# Patient Record
Sex: MALE | Race: WHITE | Employment: OTHER | URBAN - METROPOLITAN AREA
[De-identification: names, ages, dates, MRNs, and addresses within clinical notes are randomized per-mention and may not be internally consistent; named-entity substitution may affect disease eponyms.]

---

## 2021-04-24 ENCOUNTER — HOSPITAL ENCOUNTER (OUTPATIENT)
Age: 86
Setting detail: OBSERVATION
Discharge: HOME | End: 2021-04-26
Attending: STUDENT IN AN ORGANIZED HEALTH CARE EDUCATION/TRAINING PROGRAM | Admitting: HOSPITALIST
Payer: MEDICARE

## 2021-04-24 DIAGNOSIS — D64.9 ANEMIA, UNSPECIFIED TYPE: ICD-10-CM

## 2021-04-24 DIAGNOSIS — R04.0 EPISTAXIS: Primary | ICD-10-CM

## 2021-04-24 LAB
APTT PPP: 23.8 SEC (ref 22.1–31)
BASOPHILS # BLD: 0.1 K/UL (ref 0–0.1)
BASOPHILS NFR BLD: 1 % (ref 0–1)
COMMENT, HOLDF: NORMAL
DIFFERENTIAL METHOD BLD: ABNORMAL
EOSINOPHIL # BLD: 0.6 K/UL (ref 0–0.4)
EOSINOPHIL NFR BLD: 7 % (ref 0–7)
ERYTHROCYTE [DISTWIDTH] IN BLOOD BY AUTOMATED COUNT: 13.2 % (ref 11.5–14.5)
HCT VFR BLD AUTO: 32.5 % (ref 36.6–50.3)
HGB BLD-MCNC: 10.2 G/DL (ref 12.1–17)
IMM GRANULOCYTES # BLD AUTO: 0 K/UL (ref 0–0.04)
IMM GRANULOCYTES NFR BLD AUTO: 0 % (ref 0–0.5)
INR PPP: 1.1 (ref 0.9–1.1)
LYMPHOCYTES # BLD: 1.2 K/UL (ref 0.8–3.5)
LYMPHOCYTES NFR BLD: 12 % (ref 12–49)
MCH RBC QN AUTO: 31.3 PG (ref 26–34)
MCHC RBC AUTO-ENTMCNC: 31.4 G/DL (ref 30–36.5)
MCV RBC AUTO: 99.7 FL (ref 80–99)
MONOCYTES # BLD: 0.9 K/UL (ref 0–1)
MONOCYTES NFR BLD: 9 % (ref 5–13)
NEUTS SEG # BLD: 6.9 K/UL (ref 1.8–8)
NEUTS SEG NFR BLD: 71 % (ref 32–75)
NRBC # BLD: 0 K/UL (ref 0–0.01)
NRBC BLD-RTO: 0 PER 100 WBC
PLATELET # BLD AUTO: 178 K/UL (ref 150–400)
PMV BLD AUTO: 11.2 FL (ref 8.9–12.9)
PROTHROMBIN TIME: 11.3 SEC (ref 9–11.1)
RBC # BLD AUTO: 3.26 M/UL (ref 4.1–5.7)
SAMPLES BEING HELD,HOLD: NORMAL
THERAPEUTIC RANGE,PTTT: NORMAL SECS (ref 58–77)
WBC # BLD AUTO: 9.7 K/UL (ref 4.1–11.1)

## 2021-04-24 PROCEDURE — 85025 COMPLETE CBC W/AUTO DIFF WBC: CPT

## 2021-04-24 PROCEDURE — 85610 PROTHROMBIN TIME: CPT

## 2021-04-24 PROCEDURE — 75810000284 HC CNTRL NASAL HEMORHRAGE SIMPLE

## 2021-04-24 PROCEDURE — 74011000250 HC RX REV CODE- 250: Performed by: STUDENT IN AN ORGANIZED HEALTH CARE EDUCATION/TRAINING PROGRAM

## 2021-04-24 PROCEDURE — 99285 EMERGENCY DEPT VISIT HI MDM: CPT

## 2021-04-24 PROCEDURE — 36415 COLL VENOUS BLD VENIPUNCTURE: CPT

## 2021-04-24 PROCEDURE — 86901 BLOOD TYPING SEROLOGIC RH(D): CPT

## 2021-04-24 PROCEDURE — 85730 THROMBOPLASTIN TIME PARTIAL: CPT

## 2021-04-24 RX ORDER — OXYMETAZOLINE HCL 0.05 %
2 SPRAY, NON-AEROSOL (ML) NASAL
Status: DISCONTINUED | OUTPATIENT
Start: 2021-04-24 | End: 2021-04-24

## 2021-04-24 RX ORDER — TRANEXAMIC ACID 100 MG/ML
1 INJECTION, SOLUTION INTRAVENOUS ONCE
Status: COMPLETED | OUTPATIENT
Start: 2021-04-24 | End: 2021-04-24

## 2021-04-24 RX ADMIN — TRANEXAMIC ACID 1000 MG: 1 INJECTION, SOLUTION INTRAVENOUS at 21:37

## 2021-04-25 LAB
ABO + RH BLD: NORMAL
ALBUMIN SERPL-MCNC: 3.2 G/DL (ref 3.5–5)
ALBUMIN/GLOB SERPL: 1 {RATIO} (ref 1.1–2.2)
ALP SERPL-CCNC: 99 U/L (ref 45–117)
ALT SERPL-CCNC: 28 U/L (ref 12–78)
ANION GAP SERPL CALC-SCNC: 4 MMOL/L (ref 5–15)
AST SERPL-CCNC: 29 U/L (ref 15–37)
BASOPHILS # BLD: 0.1 K/UL (ref 0–0.1)
BASOPHILS NFR BLD: 0 % (ref 0–1)
BILIRUB SERPL-MCNC: 0.3 MG/DL (ref 0.2–1)
BLOOD GROUP ANTIBODIES SERPL: NORMAL
BUN SERPL-MCNC: 44 MG/DL (ref 6–20)
BUN/CREAT SERPL: 33 (ref 12–20)
CALCIUM SERPL-MCNC: 8.4 MG/DL (ref 8.5–10.1)
CHLORIDE SERPL-SCNC: 111 MMOL/L (ref 97–108)
CO2 SERPL-SCNC: 27 MMOL/L (ref 21–32)
CREAT SERPL-MCNC: 1.32 MG/DL (ref 0.7–1.3)
DIFFERENTIAL METHOD BLD: ABNORMAL
EOSINOPHIL # BLD: 0.1 K/UL (ref 0–0.4)
EOSINOPHIL NFR BLD: 1 % (ref 0–7)
ERYTHROCYTE [DISTWIDTH] IN BLOOD BY AUTOMATED COUNT: 13.2 % (ref 11.5–14.5)
GLOBULIN SER CALC-MCNC: 3.2 G/DL (ref 2–4)
GLUCOSE SERPL-MCNC: 108 MG/DL (ref 65–100)
HCT VFR BLD AUTO: 31.2 % (ref 36.6–50.3)
HGB BLD-MCNC: 10 G/DL (ref 12.1–17)
IMM GRANULOCYTES # BLD AUTO: 0.1 K/UL (ref 0–0.04)
IMM GRANULOCYTES NFR BLD AUTO: 0 % (ref 0–0.5)
LYMPHOCYTES # BLD: 1.2 K/UL (ref 0.8–3.5)
LYMPHOCYTES NFR BLD: 10 % (ref 12–49)
MCH RBC QN AUTO: 31.8 PG (ref 26–34)
MCHC RBC AUTO-ENTMCNC: 32.1 G/DL (ref 30–36.5)
MCV RBC AUTO: 99.4 FL (ref 80–99)
MONOCYTES # BLD: 0.7 K/UL (ref 0–1)
MONOCYTES NFR BLD: 6 % (ref 5–13)
NEUTS SEG # BLD: 9.9 K/UL (ref 1.8–8)
NEUTS SEG NFR BLD: 83 % (ref 32–75)
NRBC # BLD: 0 K/UL (ref 0–0.01)
NRBC BLD-RTO: 0 PER 100 WBC
PLATELET # BLD AUTO: 147 K/UL (ref 150–400)
PMV BLD AUTO: 11.4 FL (ref 8.9–12.9)
POTASSIUM SERPL-SCNC: 4.8 MMOL/L (ref 3.5–5.1)
PROT SERPL-MCNC: 6.4 G/DL (ref 6.4–8.2)
RBC # BLD AUTO: 3.14 M/UL (ref 4.1–5.7)
SODIUM SERPL-SCNC: 142 MMOL/L (ref 136–145)
SPECIMEN EXP DATE BLD: NORMAL
WBC # BLD AUTO: 12 K/UL (ref 4.1–11.1)

## 2021-04-25 PROCEDURE — 99218 HC RM OBSERVATION: CPT

## 2021-04-25 PROCEDURE — 80053 COMPREHEN METABOLIC PANEL: CPT

## 2021-04-25 PROCEDURE — 85025 COMPLETE CBC W/AUTO DIFF WBC: CPT

## 2021-04-25 PROCEDURE — 36415 COLL VENOUS BLD VENIPUNCTURE: CPT

## 2021-04-25 PROCEDURE — 74011250637 HC RX REV CODE- 250/637: Performed by: HOSPITALIST

## 2021-04-25 RX ORDER — ACETAMINOPHEN 650 MG/1
650 SUPPOSITORY RECTAL
Status: DISCONTINUED | OUTPATIENT
Start: 2021-04-25 | End: 2021-04-26 | Stop reason: HOSPADM

## 2021-04-25 RX ORDER — PROMETHAZINE HYDROCHLORIDE 25 MG/1
12.5 TABLET ORAL
Status: DISCONTINUED | OUTPATIENT
Start: 2021-04-25 | End: 2021-04-26 | Stop reason: HOSPADM

## 2021-04-25 RX ORDER — POLYETHYLENE GLYCOL 3350 17 G/17G
17 POWDER, FOR SOLUTION ORAL DAILY PRN
Status: DISCONTINUED | OUTPATIENT
Start: 2021-04-25 | End: 2021-04-26 | Stop reason: HOSPADM

## 2021-04-25 RX ORDER — AMOXICILLIN AND CLAVULANATE POTASSIUM 875; 125 MG/1; MG/1
1 TABLET, FILM COATED ORAL EVERY 12 HOURS
Status: DISCONTINUED | OUTPATIENT
Start: 2021-04-25 | End: 2021-04-25

## 2021-04-25 RX ORDER — BRINZOLAMIDE 10 MG/ML
1 SUSPENSION/ DROPS OPHTHALMIC 2 TIMES DAILY
Status: DISCONTINUED | OUTPATIENT
Start: 2021-04-25 | End: 2021-04-26 | Stop reason: HOSPADM

## 2021-04-25 RX ORDER — SODIUM CHLORIDE 0.9 % (FLUSH) 0.9 %
5-40 SYRINGE (ML) INJECTION AS NEEDED
Status: DISCONTINUED | OUTPATIENT
Start: 2021-04-25 | End: 2021-04-26 | Stop reason: HOSPADM

## 2021-04-25 RX ORDER — ACETAMINOPHEN 325 MG/1
650 TABLET ORAL
Status: DISCONTINUED | OUTPATIENT
Start: 2021-04-25 | End: 2021-04-26 | Stop reason: HOSPADM

## 2021-04-25 RX ORDER — AMOXICILLIN AND CLAVULANATE POTASSIUM 875; 125 MG/1; MG/1
1 TABLET, FILM COATED ORAL EVERY 12 HOURS
Status: DISCONTINUED | OUTPATIENT
Start: 2021-04-25 | End: 2021-04-26 | Stop reason: HOSPADM

## 2021-04-25 RX ORDER — TIMOLOL MALEATE 5 MG/ML
1 SOLUTION/ DROPS OPHTHALMIC EVERY MORNING
Status: DISCONTINUED | OUTPATIENT
Start: 2021-04-26 | End: 2021-04-26 | Stop reason: HOSPADM

## 2021-04-25 RX ORDER — SODIUM CHLORIDE 0.9 % (FLUSH) 0.9 %
5-40 SYRINGE (ML) INJECTION EVERY 8 HOURS
Status: DISCONTINUED | OUTPATIENT
Start: 2021-04-25 | End: 2021-04-26 | Stop reason: HOSPADM

## 2021-04-25 RX ORDER — ONDANSETRON 2 MG/ML
4 INJECTION INTRAMUSCULAR; INTRAVENOUS
Status: DISCONTINUED | OUTPATIENT
Start: 2021-04-25 | End: 2021-04-26 | Stop reason: HOSPADM

## 2021-04-25 RX ADMIN — Medication 10 ML: at 13:39

## 2021-04-25 RX ADMIN — AMOXICILLIN AND CLAVULANATE POTASSIUM 1 TABLET: 875; 125 TABLET, FILM COATED ORAL at 21:31

## 2021-04-25 RX ADMIN — AMOXICILLIN AND CLAVULANATE POTASSIUM 1 TABLET: 875; 125 TABLET, FILM COATED ORAL at 12:32

## 2021-04-25 RX ADMIN — BRINZOLAMIDE 1 DROP: 10 SUSPENSION/ DROPS OPHTHALMIC at 21:27

## 2021-04-25 NOTE — PROGRESS NOTES
Bedside and Verbal shift change report given to Jose Angel Zimmer RN (oncoming nurse) by Sasha Murcia RN (offgoing nurse). Report included the following information SBAR.

## 2021-04-25 NOTE — ED NOTES
Bedside shift change report given to Jennifer Glass (oncoming nurse) by Aren Bernard (offgoing nurse). Report included the following information SBAR, ED Summary and Recent Results.

## 2021-04-25 NOTE — ROUTINE PROCESS
TRANSFER - OUT REPORT: 
 
Verbal report given to Rodrigo Byrnes RN (name) on Erica Waldrop  being transferred to (unit) for routine progression of care Report consisted of patients Situation, Background, Assessment and  
Recommendations(SBAR). Information from the following report(s) SBAR, Kardex, ED Summary, Intake/Output, MAR and Recent Results was reviewed with the receiving nurse. Lines:  
Peripheral IV 04/24/21 Right Wrist (Active) Site Assessment Clean, dry, & intact 04/24/21 2349 Alcohol Cap Used No 04/24/21 2349 Opportunity for questions and clarification was provided. Patient transported with: 
 Registered Nurse

## 2021-04-25 NOTE — H&P
HISTORY AND PHYSICAL      PCP: None  History source: the patient, family      CC: nosebleed      HPI: 80 y.o man with CAD and carotid artery stenosis on aspirin and Plavix who pesents with epistaxis. He had an episode earlier in the week and went to [facility] EMERGENCY [facility] ER where he had afrin and temporary packing. He was discharged home from the ED. Earlier in the night he blew his nose and started bleeding profusely from both nares. He is now s/p left nare packing in the ED. He denies dyspnea or dizziness. PMH/PSH: per family  CAD  Carotid artery stenosis    Home meds:   ASA  Plavix    Allergies: Allergies   Allergen Reactions    Penicillins Unknown (comments)       FH:  History reviewed. No pertinent family history. SH:  Social History     Tobacco Use    Smoking status: Former Smoker    Smokeless tobacco: Never Used   Substance Use Topics    Alcohol use: Yes     Comment: Wine on occasion       ROS: A comprehensive review of systems was negative except for that written in the HPI. PHYSICAL EXAM:  Visit Vitals  BP (!) 115/97   Pulse 95   Temp 98.5 °F (36.9 °C)   Resp 18   SpO2 97%       Gen: NAD, non-toxic  HEENT: anicteric sclerae, normal conjunctiva, L nare packed.  Surrounding dried blood  Neck: supple, trachea midline, no adenopathy  Heart: RRR, no MRG, no JVD  Lungs: CTA b/l, non-labored respirations  Abd: soft, NT, ND, BS+  Extr: warm  Skin: dry, no rash  Neuro: CN II-XII grossly intact, normal speech, moves all extremities  Psych: normal mood, appropriate affect      Labs/Imaging:  Recent Results (from the past 24 hour(s))   CBC WITH AUTOMATED DIFF    Collection Time: 04/24/21  9:48 PM   Result Value Ref Range    WBC 9.7 4.1 - 11.1 K/uL    RBC 3.26 (L) 4.10 - 5.70 M/uL    HGB 10.2 (L) 12.1 - 17.0 g/dL    HCT 32.5 (L) 36.6 - 50.3 %    MCV 99.7 (H) 80.0 - 99.0 FL    MCH 31.3 26.0 - 34.0 PG    MCHC 31.4 30.0 - 36.5 g/dL    RDW 13.2 11.5 - 14.5 %    PLATELET 723 529 - 352 K/uL    MPV 11.2 8.9 - 12.9 FL NRBC 0.0 0  WBC    ABSOLUTE NRBC 0.00 0.00 - 0.01 K/uL    NEUTROPHILS 71 32 - 75 %    LYMPHOCYTES 12 12 - 49 %    MONOCYTES 9 5 - 13 %    EOSINOPHILS 7 0 - 7 %    BASOPHILS 1 0 - 1 %    IMMATURE GRANULOCYTES 0 0.0 - 0.5 %    ABS. NEUTROPHILS 6.9 1.8 - 8.0 K/UL    ABS. LYMPHOCYTES 1.2 0.8 - 3.5 K/UL    ABS. MONOCYTES 0.9 0.0 - 1.0 K/UL    ABS. EOSINOPHILS 0.6 (H) 0.0 - 0.4 K/UL    ABS. BASOPHILS 0.1 0.0 - 0.1 K/UL    ABS. IMM. GRANS. 0.0 0.00 - 0.04 K/UL    DF AUTOMATED     PTT    Collection Time: 04/24/21  9:48 PM   Result Value Ref Range    aPTT 23.8 22.1 - 31.0 sec    aPTT, therapeutic range     58.0 - 77.0 SECS   PROTHROMBIN TIME + INR    Collection Time: 04/24/21  9:48 PM   Result Value Ref Range    INR 1.1 0.9 - 1.1      Prothrombin time 11.3 (H) 9.0 - 11.1 sec   SAMPLES BEING HELD    Collection Time: 04/24/21  9:48 PM   Result Value Ref Range    SAMPLES BEING HELD 1PST 1RED     COMMENT        Add-on orders for these samples will be processed based on acceptable specimen integrity and analyte stability, which may vary by analyte. Recent Labs     04/24/21 2148   WBC 9.7   HGB 10.2*   HCT 32.5*        No results for input(s): NA, K, CL, CO2, BUN, CREA, GLU, CA, MG, PHOS, URICA in the last 72 hours. No results for input(s): ALT, AP, TBIL, TBILI, TP, ALB, GLOB, GGT, AML, LPSE in the last 72 hours. No lab exists for component: SGOT, GPT, AMYP, HLPSE    No results for input(s): CPK, CKNDX, TROIQ in the last 72 hours. No lab exists for component: CPKMB    Recent Labs     04/24/21 2148   INR 1.1   PTP 11.3*   APTT 23.8        No results for input(s): PH, PCO2, PO2 in the last 72 hours. No results found.         Assessment & Plan:     Epistaxis: resolved after packing L nare  -admit for observation  -consult ENT in the AM as this is the second episode  -hold aspirin and Plavix    CAD, carotid artery stenosis    DVT ppx: SCDs  Code status: full  Disposition: home when ready    Signed By: Vernon Mendiola MD     April 25, 2021

## 2021-04-25 NOTE — CONSULTS
Consulted for epistaxis - pack in - on plavix and asa - bleeding controlled with pack in -   Leave pack in   Saline tid bilateral nares  Afrin bid and if bleeding from the pack  Can discharge when stable from other standpoints - must stay off the plavix and aspirin -   Will see in office on Thursday at 0830 AM when I can remove the pack - packs stay in for 5 days if patient has been on anticoagulants  Be sure patient is covered with anti sinusitis abx such as augmentin for 14 days  Gopi Lenz MD

## 2021-04-25 NOTE — PROGRESS NOTES
Bedside and Verbal shift change report given to Danni Nunez RN (oncoming nurse) by Viola Griffin RN (offgoing nurse). Report included the following information SBAR.

## 2021-04-25 NOTE — ED TRIAGE NOTES
EMS reports the pt started having a nosebleed 30 mins prior to their arrival. EMS reports placing a stopper on the nose and the bleeding has decreased markedly en route. EMS reports the pt is on an unknown blood thinner and a baby aspirin. Pt reports the nosebleed happened suddenly after blowing his nose.

## 2021-04-25 NOTE — ED PROVIDER NOTES
The patient is a 20-year-old male presenting today with epistaxis. He is on aspirin and Plavix but no other blood thinners. This is a second nosebleed in the week. He was started on Afrin several days ago at another emergency department. This evening after blowing his nose he started with significant bleeding. Initially it was from the right side but then it moved to the left side. He has not had shortness of breath, dizziness or chest pain. He denies any other easy bruising or bleeding           History reviewed. No pertinent past medical history. History reviewed. No pertinent surgical history. History reviewed. No pertinent family history.     Social History     Socioeconomic History    Marital status:      Spouse name: Not on file    Number of children: Not on file    Years of education: Not on file    Highest education level: Not on file   Occupational History    Not on file   Social Needs    Financial resource strain: Not on file    Food insecurity     Worry: Not on file     Inability: Not on file    Transportation needs     Medical: Not on file     Non-medical: Not on file   Tobacco Use    Smoking status: Former Smoker    Smokeless tobacco: Never Used   Substance and Sexual Activity    Alcohol use: Yes     Comment: Wine on occasion    Drug use: Never    Sexual activity: Not on file   Lifestyle    Physical activity     Days per week: Not on file     Minutes per session: Not on file    Stress: Not on file   Relationships    Social connections     Talks on phone: Not on file     Gets together: Not on file     Attends Anabaptist service: Not on file     Active member of club or organization: Not on file     Attends meetings of clubs or organizations: Not on file     Relationship status: Not on file    Intimate partner violence     Fear of current or ex partner: Not on file     Emotionally abused: Not on file     Physically abused: Not on file     Forced sexual activity: Not on file   Other Topics Concern    Not on file   Social History Narrative    Not on file         ALLERGIES: Penicillins    Review of Systems   Constitutional: Negative for chills and fever. HENT: Positive for nosebleeds. Negative for congestion and sore throat. Eyes: Negative for pain and redness. Respiratory: Negative for cough and shortness of breath. Cardiovascular: Negative for chest pain and palpitations. Gastrointestinal: Negative for abdominal pain, diarrhea, nausea and vomiting. Genitourinary: Negative for frequency and hematuria. Musculoskeletal: Negative for back pain and neck pain. Skin: Negative for rash and wound. Neurological: Negative for dizziness and headaches. Hematological: Does not bruise/bleed easily. Vitals:    04/24/21 2053   BP: (!) 149/89   Pulse: 95   Resp: 18   Temp: 98.5 °F (36.9 °C)   SpO2: 98%            Physical Exam  Vitals signs and nursing note reviewed. Constitutional:       General: He is not in acute distress. Appearance: He is well-developed. HENT:      Head: Normocephalic and atraumatic. Nose:      Comments: Bilateral nares with active bleeding, L>R  There is blood in the posterior oropharynx  There are clots in bilateral nares    Eyes:      Conjunctiva/sclera: Conjunctivae normal.      Pupils: Pupils are equal, round, and reactive to light. Neck:      Musculoskeletal: Normal range of motion and neck supple. Cardiovascular:      Rate and Rhythm: Normal rate and regular rhythm. Heart sounds: Normal heart sounds. No murmur. No friction rub. No gallop. Pulmonary:      Effort: Pulmonary effort is normal. No respiratory distress. Breath sounds: Normal breath sounds. No wheezing or rales. Abdominal:      General: Bowel sounds are normal. There is no distension. Palpations: Abdomen is soft. Tenderness: There is no abdominal tenderness. There is no guarding or rebound. Musculoskeletal: Normal range of motion. Skin:     General: Skin is warm and dry. Capillary Refill: Capillary refill takes less than 2 seconds. Findings: No rash. Neurological:      Mental Status: He is alert and oriented to person, place, and time. MDM  Attempted to place TXA soaked gauze in bilateral nares with a clamp without improvement. Procedure Note - Epistaxis Management:   Performed by Alexander Wang DO . Immediately prior to the procedure, the patient was reevaluated and found suitable for the planned procedure and any planned medications. Immediately prior to the procedure a time out was called to verify the correct patient, procedure, equipment, staff, and marking as appropriate. After administration of oxymetozline, the patient underwent nasal pack placement with RhinoRocket in the left nare(s). Unable to advance 7.5cm (patient has hx of multiple nasal fx per daughter) so a 5.5cm was placed. Hemostasis was achieved after placement. The procedure was tolerated well.    80 y.o. male presenting today with b/l epistaxis, L seemed to be worse. Failed afrin at home. Attempted txa here without relief. Elected to pack the nose, starting with L as it seemed to be worse. Bleeding seemed to have improved after L side packed, will observe him to ensure no re-bleeding. Labs with mild anemia but coags/platelets ok. VS stable. 11:34 PM re-evaluated. Nose bleed continues to be hemostatic however pt had large bloody emesis presumably from swallowing blood. Given his age, amount of blood loss, and discomfort from nasal packing and need for airway monitoring pt will be admitted. Perfect Serve Consult for Admission  11:36 PM    ED Room Number: ER05/05  Patient Name and age: Adelita Aguirre 80 y.o.  male  Working Diagnosis:   1. Epistaxis    2.  Anemia, unspecified type        COVID-19 Suspicion:  no  Sepsis present:  no  Reassessment needed: no  Code Status:  Full Code  Readmission: no  Isolation Requirements: no  Recommended Level of Care:  med/surg  Department:[facility] Adult ED - [phone]  Other:  80 y.o. male on asa. plavix here with bilateral nose bleed, L side now packed with rhinorocket which seems to have slowed bleeding. Mildly anemic. Pt with large emesis of bright red blood. Uncomfortable going home. Would prefer he be admitted for airway watch, ENT consult, and serial cbc. May need R side packed if bleeding resumes.           Joe Kramer, DO

## 2021-04-25 NOTE — PROGRESS NOTES
Hospitalist Progress Note              Bhumi Musa MD.                                                             Cell: [phone]                               NAME:  Matthias Martinez  :  1930  MRN:  [id number]  Date of Service:  2021    Summary: 80 y.o. male with past medical history of CAD on aspirin and plavix who presented on 2021 with epistaxis. He is status post nasal packing and has been evaluated by ENT       Assessment/Plan:  Epistaxis: status post nasal packing  Now resolved  Hold off plavix and aspirin  Start empiric Augmentin 1 tab twice daily for 14 days. He is allergic to penicillin. We will monitor for allergic RXN for 24 hrs prior to discharge since he is on Augmentin    Resume home medications    Follow up with ENT on discharge tomorrow. Code status:Full  DVT prophylaxsis: SCD  Dispo: Likely dc home tomorrow       Interval History/Subjective:  F/u for Epistaxis  Nasal bleeding has resolved  No new complaint    Review of Systems:  A comprehensive review of systems was negative except for that written in the HPI. Objective:     VITALS:   Last 24hrs VS reviewed since prior progress note. Most recent are:  Visit Vitals  /71 (BP 1 Location: Left upper arm, BP Patient Position: At rest)   Pulse 76   Temp 98.1 °F (36.7 °C)   Resp 17   Ht 5' 6\" (1.676 m)   Wt 58.3 kg (128 lb 8.5 oz)   SpO2 97%   BMI 20.74 kg/m²     No intake or output data in the 24 hours ending 21 1529     PHYSICAL EXAM:  General: No acute distress, cooperative, pleasant   EENT: EOMI. Anicteric sclerae. Oral mucous moist, oropharynx benign  Resp: CTA bilaterally. No wheezing/rhonchi/rales. No accessory muscle use  CV: Regular rhythm, normal rate, no murmurs, gallops, rubs  GI: Soft, non distended, non tender. normoactive bowel sounds, no hepatosplenomegaly Extremities: No edema, warm, 2+ pulses throughout  Neurologic: Moves all extremities.   AAOx3, CN II-XII grossly intact  Psych: Good insight. Not anxious nor agitated. Skin: Good Turgor, no rashes or ulcers    Lab Data Personally Reviewed: (see below)     Medications list Personally Reviewed:  x YES  NO     _______________________________________________________________________  Care Plan discussed with:  Patient/Family    Total NON critical care TIME:  30 minutes    New Paige MD     Procedures: see electronic medical records for all procedures/Xrays and details which were not copied into this note but were reviewed prior to creation of Plan. LABS:  Recent Labs     04/25/21  0959 04/24/21  2148   WBC 12.0* 9.7   HGB 10.0* 10.2*   HCT 31.2* 32.5*   * 178     Recent Labs     04/25/21  0959      K 4.8   *   CO2 27   BUN 44*   CREA 1.32*   *   CA 8.4*     Recent Labs     04/25/21  0959   ALT 28   AP 99   TBILI 0.3   TP 6.4   ALB 3.2*   GLOB 3.2     Recent Labs     04/24/21  2148   INR 1.1   PTP 11.3*   APTT 23.8      No results for input(s): FE, TIBC, PSAT, FERR in the last 72 hours. No results found for: FOL, RBCF   No results for input(s): PH, PCO2, PO2 in the last 72 hours. No results for input(s): CPK, CKNDX, TROIQ in the last 72 hours.     No lab exists for component: CPKMB  No results found for: CHOL, CHOLX, CHLST, CHOLV, HDL, HDLP, LDL, LDLC, DLDLP, TGLX, TRIGL, TRIGP, CHHD, CHHDX  No results found for: GLUCPOC  No results found for: COLOR, APPRN, SPGRU, REFSG, RAAD, PROTU, GLUCU, KETU, BILU, UROU, ISSA, LEUKU, GLUKE, EPSU, BACTU, WBCU, RBCU, CASTS, UCRY

## 2021-04-26 VITALS
TEMPERATURE: 98.3 F | HEART RATE: 77 BPM | OXYGEN SATURATION: 98 % | RESPIRATION RATE: 16 BRPM | WEIGHT: 128.53 LBS | BODY MASS INDEX: 20.66 KG/M2 | HEIGHT: 66 IN | SYSTOLIC BLOOD PRESSURE: 137 MMHG | DIASTOLIC BLOOD PRESSURE: 60 MMHG

## 2021-04-26 LAB
ANION GAP SERPL CALC-SCNC: 5 MMOL/L (ref 5–15)
BASOPHILS # BLD: 0.1 K/UL (ref 0–0.1)
BASOPHILS NFR BLD: 1 % (ref 0–1)
BUN SERPL-MCNC: 34 MG/DL (ref 6–20)
BUN/CREAT SERPL: 28 (ref 12–20)
CALCIUM SERPL-MCNC: 8.3 MG/DL (ref 8.5–10.1)
CHLORIDE SERPL-SCNC: 112 MMOL/L (ref 97–108)
CO2 SERPL-SCNC: 29 MMOL/L (ref 21–32)
CREAT SERPL-MCNC: 1.2 MG/DL (ref 0.7–1.3)
DIFFERENTIAL METHOD BLD: ABNORMAL
EOSINOPHIL # BLD: 0.4 K/UL (ref 0–0.4)
EOSINOPHIL NFR BLD: 3 % (ref 0–7)
ERYTHROCYTE [DISTWIDTH] IN BLOOD BY AUTOMATED COUNT: 13.7 % (ref 11.5–14.5)
GLUCOSE SERPL-MCNC: 127 MG/DL (ref 65–100)
HCT VFR BLD AUTO: 30.8 % (ref 36.6–50.3)
HGB BLD-MCNC: 9.6 G/DL (ref 12.1–17)
IMM GRANULOCYTES # BLD AUTO: 0.1 K/UL (ref 0–0.04)
IMM GRANULOCYTES NFR BLD AUTO: 1 % (ref 0–0.5)
LYMPHOCYTES # BLD: 1.6 K/UL (ref 0.8–3.5)
LYMPHOCYTES NFR BLD: 12 % (ref 12–49)
MCH RBC QN AUTO: 31.3 PG (ref 26–34)
MCHC RBC AUTO-ENTMCNC: 31.2 G/DL (ref 30–36.5)
MCV RBC AUTO: 100.3 FL (ref 80–99)
MONOCYTES # BLD: 1.1 K/UL (ref 0–1)
MONOCYTES NFR BLD: 9 % (ref 5–13)
NEUTS SEG # BLD: 10 K/UL (ref 1.8–8)
NEUTS SEG NFR BLD: 74 % (ref 32–75)
NRBC # BLD: 0 K/UL (ref 0–0.01)
NRBC BLD-RTO: 0 PER 100 WBC
PLATELET # BLD AUTO: 159 K/UL (ref 150–400)
PMV BLD AUTO: 12.2 FL (ref 8.9–12.9)
POTASSIUM SERPL-SCNC: 4.6 MMOL/L (ref 3.5–5.1)
RBC # BLD AUTO: 3.07 M/UL (ref 4.1–5.7)
SODIUM SERPL-SCNC: 146 MMOL/L (ref 136–145)
WBC # BLD AUTO: 13.4 K/UL (ref 4.1–11.1)

## 2021-04-26 PROCEDURE — 74011250637 HC RX REV CODE- 250/637: Performed by: HOSPITALIST

## 2021-04-26 PROCEDURE — 36415 COLL VENOUS BLD VENIPUNCTURE: CPT

## 2021-04-26 PROCEDURE — 85025 COMPLETE CBC W/AUTO DIFF WBC: CPT

## 2021-04-26 PROCEDURE — 80048 BASIC METABOLIC PNL TOTAL CA: CPT

## 2021-04-26 PROCEDURE — 74011000250 HC RX REV CODE- 250: Performed by: HOSPITALIST

## 2021-04-26 PROCEDURE — 99218 HC RM OBSERVATION: CPT

## 2021-04-26 RX ORDER — AMOXICILLIN AND CLAVULANATE POTASSIUM 875; 125 MG/1; MG/1
1 TABLET, FILM COATED ORAL EVERY 12 HOURS
Qty: 24 TAB | Refills: 0 | Status: SHIPPED | OUTPATIENT
Start: 2021-04-26 | End: 2021-05-08

## 2021-04-26 RX ADMIN — Medication 10 ML: at 15:09

## 2021-04-26 RX ADMIN — AMOXICILLIN AND CLAVULANATE POTASSIUM 1 TABLET: 875; 125 TABLET, FILM COATED ORAL at 11:06

## 2021-04-26 RX ADMIN — TIMOLOL MALEATE 1 DROP: 5 SOLUTION OPHTHALMIC at 08:34

## 2021-04-26 RX ADMIN — BRINZOLAMIDE 1 DROP: 10 SUSPENSION/ DROPS OPHTHALMIC at 11:09

## 2021-04-26 NOTE — PROGRESS NOTES
[address] [facility] visit. Mr. Nano Ramirez was asleep and the visitor with him was on the phone. He is Confucianism. Prayer for spiritual communion offered for his well-being.     POLLO Tejeda, RN, ACSW, LCSW   Page:  [phone]

## 2021-04-26 NOTE — PROGRESS NOTES
DANAY: The patient plans to discharge home with daughter to transport when stable for discharge. RUR: N/A    1. The patient is from home and lives with his daughter. 2. Hospitalist following. 3. The patient plans to discharge home with daughter Jaycee Bloom to transport. Care Management Interventions  PCP Verified by CM: Yes  Palliative Care Criteria Met (RRAT>21 & CHF Dx)?: No  Mode of Transport at Discharge: Other (see comment)  [address]: No  MyChart Signup: No  Discharge Durable Medical Equipment: No  Health Maintenance Reviewed: Yes  Physical Therapy Consult: No  Occupational Therapy Consult: No  Speech Therapy Consult: No  Current Support Network: Other  Confirm Follow Up Transport: Family  The Plan for Transition of Care is Related to the Following Treatment Goals : The patient lives with his daughter and plans for her to transport him when discharged. The Patient and/or Patient Representative was Provided with a Choice of Provider and Agrees with the Discharge Plan?: No  Freedom of Choice List was Provided with Basic Dialogue that Supports the Patient's Individualized Plan of Care/Goals, Treatment Preferences and Shares the Quality Data Associated with the Providers?: No  [place] Resource Information Provided?: No  Discharge Location  Discharge Placement: Home with family assistance     Reason for Admission:  Epistaxis                     RUR Score:   N/A                  Plan for utilizing home health:  No indications at this time. PCP: First and Last name:  Dr. Jaren Robert.  Phone: [phone]     Name of Practice:    Are you a current patient: Yes/No: Yes   Approximate date of last visit: March 2021   Can you participate in a virtual visit with your PCP: Yes                    Current Advanced Directive/Advance Care Plan: Full Code      Healthcare Decision Maker:   Click here to complete [address] including selection of the Healthcare Decision Maker Relationship (ie \"Primary\")                             Transition of Care Plan:         CM spoke with patient in room 577. The patient is alert and oriented x4. The patient is independent with ADL's/IADL's and owns a walker and cane at home. The patient lives with his daughter Narayan Maynard [phone] and no longer drives. The patent's daughter transports the patient to his appointments as well. The patient plans to discharge home with his daughter to transport when stable for discharge. CM following for discharge needs.     Andrew Wang RN/CRM

## 2021-04-26 NOTE — DISCHARGE SUMMARY
Discharge Summary       PATIENT ID: Josefina Santillan  MRN: [id number]   YOB: 1930    DATE OF ADMISSION: 4/24/2021  8:47 PM    DATE OF DISCHARGE: 4/26/2021   PRIMARY CARE PROVIDER: None     ATTENDING PHYSICIAN: Ashanti Sanders MD  DISCHARGING PROVIDER: Ashanti Sanders MD    To contact this individual call [phone] and ask the  to page. If unavailable ask to be transferred the Adult Hospitalist Department. CONSULTATIONS: IP CONSULT TO OTOLARYNGOLOGY    PROCEDURES/SURGERIES: * No surgery found *    ADMITTING [address] [place] [address] COURSE:   Evaluated and treated for Epistaxis, controlled with nasal packing. Evaluated by ENT. F/u op with ENT in few days to remove packing and evaluate. Risk of Re-Admission: low  DISCHARGE DIAGNOSES / PLAN:      Epistaxis: status post nasal packing- Now resolved  Hold off plavix and aspirin till after f/u op with ENT. Start empiric Augmentin 1 tab twice daily for 14 days. He is allergic to penicillin. We will monitor for allergic RXN for 24 hrs prior to discharge since he is on Augmentin   Resume home medications  Follow up with ENT on discharge as scheduled with Dr Mariely Mercedes on 4/29 at 8:30    Elevated Cr: 1.3 on admit, dehydration, doesn't drink well, family will keep encouraging po fluids.      FOLLOW UP APPOINTMENTS:    Follow-up Information     Follow up With Specialties Details Why Contact Johanna Morgan MD Otolaryngology Schedule an appointment as soon as possible for a visit on 4/26/2021 to have rhino rocket removed [id number] [address]      Jorge Mesa MD Otolaryngology In 3 days as scheduled on Thursday 4/29 at 8:30 AM. [address] [address] [phone]           ADDITIONAL CARE RECOMMENDATIONS:  Follow up with PCPand ENT    DIET: Resume previous diet    ACTIVITY: Activity as tolerated    DISCHARGE MEDICATIONS:  Current Discharge Medication List      START taking these medications    Details   amoxicillin-clavulanate (AUGMENTIN) 875-125 mg per tablet Take 1 Tab by mouth every twelve (12) hours for 12 days. Qty: 24 Tab, Refills: 0           NOTIFY YOUR PHYSICIAN FOR ANY OF THE FOLLOWING:   Fever over 101 degrees for 24 hours. Chest pain, shortness of breath, fever, chills, nausea, vomiting, diarrhea, change in mentation, falling, weakness, bleeding. Severe pain or pain not relieved by medications. Or, any other signs or symptoms that you may have questions about. DISPOSITION:    Home With:   OT  PT  HH  RN       Long term SNF/Inpatient Rehab   x Independent/assisted living    Hospice    Other:     PATIENT CONDITION AT DISCHARGE:     Functional status    Poor     Deconditioned     Independent      Cognition     Lucid     Forgetful     Dementia      Catheters/lines (plus indication)    [place]     PICC     PEG     None      Code status     Full code     DNR      PHYSICAL EXAMINATION AT DISCHARGE:  Patient seen and examined at bedside, Condition stable, explained discharge and follow up plans. /69 (BP 1 Location: Left upper arm, BP Patient Position: At rest)   Pulse 80   Temp 97.4 °F (36.3 °C)   Resp 16   Ht 5' 6\" (1.676 m)   Wt 58.3 kg (128 lb 8.5 oz)   SpO2 98%   BMI 20.74 kg/m²   General:  Alert, oriented, No acute distress. Frail, elderly, keen on dc. Daughter at bedside. Resp:  No accessory muscle use, Good AE. Neuro:  Grossly normal, no focal neuro deficits, follows commands     CHRONIC MEDICAL DIAGNOSES:  Problem List as of 4/26/2021 Never Reviewed          Codes Class Noted - Resolved    Epistaxis ICD-10-CM: R04.0  ICD-9-CM: 784.7  4/24/2021 - Present              37 minutes were spent with the patient on counseling and coordination of care.     Signed:   Mayte Singh MD  4/26/2021  2:07 PM

## 2021-04-26 NOTE — PROGRESS NOTES
I have reviewed discharge instructions with the patient. The patient verbalized understanding. Pt provided with discharge instructions and prescriptions.  No further questions at this time

## 2021-04-26 NOTE — DISCHARGE INSTRUCTIONS
Discharge Instructions       PATIENT ID: Adelita Aguirre  MRN: [id number]   YOB: 1930    DATE OF ADMISSION: 4/24/2021  8:47 PM    DATE OF DISCHARGE: 4/26/2021    PRIMARY CARE PROVIDER: None     ATTENDING PHYSICIAN: Jacki Sweet MD  DISCHARGING PROVIDER: Asya Packer MD    To contact this individual call [phone] and ask the  to page. If unavailable ask to be transferred the Adult Hospitalist Department. DISCHARGE DIAGNOSES Epistaxis    CONSULTATIONS: IP CONSULT TO OTOLARYNGOLOGY    PROCEDURES/SURGERIES: * No surgery found *    FOLLOW UP APPOINTMENTS:   Follow-up Information     Follow up With Specialties Details Why Contact Info    Kirit Cook MD Otolaryngology Schedule an appointment as soon as possible for a visit on 4/26/2021 to have rhino rocket removed [id number] [address] [place] [address]      Ria Palm MD Otolaryngology In 3 days as scheduled on Thursday 4/29 at 8:30 AM. [address] [place] [address] [phone]             ADDITIONAL CARE RECOMMENDATIONS: follow up with PCP and ENT    DIET: Resume previous diet    DISCHARGE MEDICATIONS:  augmentin    · It is important that you take the medication exactly as they are prescribed. · Keep your medication in the bottles provided by the pharmacist and keep a list of the medication names, dosages, and times to be taken in your wallet. · Do not take other medications without consulting your doctor. NOTIFY YOUR PHYSICIAN FOR ANY OF THE FOLLOWING:   Fever over 101 degrees for 24 hours. Chest pain, shortness of breath, fever, chills, nausea, vomiting, diarrhea, change in mentation, falling, weakness, bleeding. Severe pain or pain not relieved by medications. Or, any other signs or symptoms that you may have questions about. It was our pleasure to help take care of you and we hope you get well very soon.     DISPOSITION:    Home With:   OT  PT  [facility]  RN       SNF/Inpatient Rehab/LTAC   x Independent/assisted living    Hospice    Other:     CDMP Checked:   Yes x     PROBLEM LIST Updated:  Yes x     Signed:   Yumiko Pedersen MD  4/26/2021  2:07 PM

## 2022-03-19 PROBLEM — R04.0 EPISTAXIS: Status: ACTIVE | Noted: 2021-04-24

## 2022-08-11 ENCOUNTER — HOSPITAL ENCOUNTER (INPATIENT)
Age: 87
LOS: 5 days | Discharge: HOME HEALTH | DRG: 659 | End: 2022-08-16
Attending: EMERGENCY MEDICINE | Admitting: FAMILY MEDICINE
Payer: MEDICARE

## 2022-08-11 ENCOUNTER — APPOINTMENT (OUTPATIENT)
Dept: CT IMAGING | Age: 87
DRG: 659 | End: 2022-08-11
Attending: EMERGENCY MEDICINE
Payer: MEDICARE

## 2022-08-11 DIAGNOSIS — N17.9 AKI (ACUTE KIDNEY INJURY) (HCC): Primary | ICD-10-CM

## 2022-08-11 DIAGNOSIS — E86.0 DEHYDRATION: ICD-10-CM

## 2022-08-11 DIAGNOSIS — N20.1 RIGHT URETERAL STONE: ICD-10-CM

## 2022-08-11 PROBLEM — N20.0 NEPHROLITHIASIS: Status: ACTIVE | Noted: 2022-08-11

## 2022-08-11 LAB
ALBUMIN SERPL-MCNC: 3.3 G/DL (ref 3.5–5)
ALBUMIN/GLOB SERPL: 0.8 {RATIO} (ref 1.1–2.2)
ALP SERPL-CCNC: 116 U/L (ref 45–117)
ALT SERPL-CCNC: 25 U/L (ref 12–78)
ANION GAP SERPL CALC-SCNC: 15 MMOL/L (ref 5–15)
APPEARANCE UR: ABNORMAL
AST SERPL-CCNC: 34 U/L (ref 15–37)
BACTERIA URNS QL MICRO: NEGATIVE /HPF
BASOPHILS # BLD: 0 K/UL (ref 0–0.1)
BASOPHILS NFR BLD: 0 % (ref 0–1)
BILIRUB SERPL-MCNC: 0.8 MG/DL (ref 0.2–1)
BILIRUB UR QL CFM: NEGATIVE
BUN SERPL-MCNC: 39 MG/DL (ref 6–20)
BUN/CREAT SERPL: 19 (ref 12–20)
CALCIUM SERPL-MCNC: 8.6 MG/DL (ref 8.5–10.1)
CHLORIDE SERPL-SCNC: 101 MMOL/L (ref 97–108)
CO2 SERPL-SCNC: 20 MMOL/L (ref 21–32)
COLOR UR: ABNORMAL
CREAT SERPL-MCNC: 2.04 MG/DL (ref 0.7–1.3)
DIFFERENTIAL METHOD BLD: ABNORMAL
EOSINOPHIL # BLD: 0 K/UL (ref 0–0.4)
EOSINOPHIL NFR BLD: 0 % (ref 0–7)
EPITH CASTS URNS QL MICRO: ABNORMAL /LPF
ERYTHROCYTE [DISTWIDTH] IN BLOOD BY AUTOMATED COUNT: 12.4 % (ref 11.5–14.5)
GLOBULIN SER CALC-MCNC: 4.1 G/DL (ref 2–4)
GLUCOSE SERPL-MCNC: 126 MG/DL (ref 65–100)
GLUCOSE UR STRIP.AUTO-MCNC: NEGATIVE MG/DL
HCT VFR BLD AUTO: 46 % (ref 36.6–50.3)
HGB BLD-MCNC: 15 G/DL (ref 12.1–17)
HGB UR QL STRIP: ABNORMAL
IMM GRANULOCYTES # BLD AUTO: 0.1 K/UL (ref 0–0.04)
IMM GRANULOCYTES NFR BLD AUTO: 0 % (ref 0–0.5)
KETONES UR QL STRIP.AUTO: 40 MG/DL
LEUKOCYTE ESTERASE UR QL STRIP.AUTO: ABNORMAL
LYMPHOCYTES # BLD: 0.4 K/UL (ref 0.8–3.5)
LYMPHOCYTES NFR BLD: 2 % (ref 12–49)
MCH RBC QN AUTO: 31.6 PG (ref 26–34)
MCHC RBC AUTO-ENTMCNC: 32.6 G/DL (ref 30–36.5)
MCV RBC AUTO: 96.8 FL (ref 80–99)
MONOCYTES # BLD: 1.2 K/UL (ref 0–1)
MONOCYTES NFR BLD: 8 % (ref 5–13)
NEUTS SEG # BLD: 13.9 K/UL (ref 1.8–8)
NEUTS SEG NFR BLD: 89 % (ref 32–75)
NITRITE UR QL STRIP.AUTO: NEGATIVE
NRBC # BLD: 0 K/UL (ref 0–0.01)
NRBC BLD-RTO: 0 PER 100 WBC
PH UR STRIP: 5.5 [PH] (ref 5–8)
PLATELET # BLD AUTO: 134 K/UL (ref 150–400)
PMV BLD AUTO: 11 FL (ref 8.9–12.9)
POTASSIUM SERPL-SCNC: 4.6 MMOL/L (ref 3.5–5.1)
PROT SERPL-MCNC: 7.4 G/DL (ref 6.4–8.2)
PROT UR STRIP-MCNC: 100 MG/DL
RBC # BLD AUTO: 4.75 M/UL (ref 4.1–5.7)
RBC #/AREA URNS HPF: ABNORMAL /HPF (ref 0–5)
SODIUM SERPL-SCNC: 136 MMOL/L (ref 136–145)
SP GR UR REFRACTOMETRY: 1.02 (ref 1–1.03)
UA: UC IF INDICATED,UAUC: ABNORMAL
UROBILINOGEN UR QL STRIP.AUTO: 0.2 EU/DL (ref 0.2–1)
WBC # BLD AUTO: 15.6 K/UL (ref 4.1–11.1)
WBC URNS QL MICRO: ABNORMAL /HPF (ref 0–4)

## 2022-08-11 PROCEDURE — 81001 URINALYSIS AUTO W/SCOPE: CPT

## 2022-08-11 PROCEDURE — 80053 COMPREHEN METABOLIC PANEL: CPT

## 2022-08-11 PROCEDURE — 99285 EMERGENCY DEPT VISIT HI MDM: CPT

## 2022-08-11 PROCEDURE — 87186 SC STD MICRODIL/AGAR DIL: CPT

## 2022-08-11 PROCEDURE — 85025 COMPLETE CBC W/AUTO DIFF WBC: CPT

## 2022-08-11 PROCEDURE — 36415 COLL VENOUS BLD VENIPUNCTURE: CPT

## 2022-08-11 PROCEDURE — 87077 CULTURE AEROBIC IDENTIFY: CPT

## 2022-08-11 PROCEDURE — 74176 CT ABD & PELVIS W/O CONTRAST: CPT

## 2022-08-11 PROCEDURE — 65270000029 HC RM PRIVATE

## 2022-08-11 PROCEDURE — 74011000250 HC RX REV CODE- 250: Performed by: EMERGENCY MEDICINE

## 2022-08-11 PROCEDURE — 74011250636 HC RX REV CODE- 250/636: Performed by: EMERGENCY MEDICINE

## 2022-08-11 PROCEDURE — 87086 URINE CULTURE/COLONY COUNT: CPT

## 2022-08-11 RX ORDER — ATORVASTATIN CALCIUM 40 MG/1
40 TABLET, FILM COATED ORAL DAILY
COMMUNITY
Start: 2022-08-01

## 2022-08-11 RX ORDER — LATANOPROST 50 UG/ML
SOLUTION/ DROPS OPHTHALMIC
COMMUNITY
Start: 2022-07-14

## 2022-08-11 RX ORDER — ALBUTEROL SULFATE 0.83 MG/ML
3 SOLUTION RESPIRATORY (INHALATION)
COMMUNITY

## 2022-08-11 RX ORDER — NETARSUDIL 0.2 MG/ML
SOLUTION/ DROPS OPHTHALMIC; TOPICAL
COMMUNITY
Start: 2022-07-08

## 2022-08-11 RX ORDER — TIMOLOL MALEATE 5 MG/ML
SOLUTION/ DROPS OPHTHALMIC
COMMUNITY
Start: 2022-07-01

## 2022-08-11 RX ORDER — BRINZOLAMIDE 10 MG/ML
1 SUSPENSION/ DROPS OPHTHALMIC 2 TIMES DAILY
COMMUNITY

## 2022-08-11 RX ORDER — PREDNISOLONE ACETATE 10 MG/ML
SUSPENSION/ DROPS OPHTHALMIC
Status: ON HOLD | COMMUNITY
Start: 2022-06-29 | End: 2022-08-12

## 2022-08-11 RX ORDER — METOPROLOL TARTRATE 25 MG/1
12.5 TABLET, FILM COATED ORAL DAILY
COMMUNITY
Start: 2022-08-01

## 2022-08-11 RX ORDER — LATANOPROSTENE BUNOD 0.24 MG/ML
SOLUTION/ DROPS OPHTHALMIC
COMMUNITY
Start: 2022-07-11

## 2022-08-11 RX ORDER — OFLOXACIN 3 MG/ML
SOLUTION/ DROPS OPHTHALMIC
Status: ON HOLD | COMMUNITY
Start: 2022-06-29 | End: 2022-08-12

## 2022-08-11 RX ADMIN — SODIUM CHLORIDE 500 ML: 9 INJECTION, SOLUTION INTRAVENOUS at 19:28

## 2022-08-11 RX ADMIN — SODIUM CHLORIDE 1 G: 9 INJECTION INTRAMUSCULAR; INTRAVENOUS; SUBCUTANEOUS at 21:20

## 2022-08-11 NOTE — ED TRIAGE NOTES
Pt reports n/v x2 days. Pt reports generalized abdominal pain 3/10 that started 3 days ago. Pt denies fevers.

## 2022-08-12 ENCOUNTER — ANESTHESIA (OUTPATIENT)
Dept: SURGERY | Age: 87
DRG: 659 | End: 2022-08-12
Payer: MEDICARE

## 2022-08-12 ENCOUNTER — ANESTHESIA EVENT (OUTPATIENT)
Dept: SURGERY | Age: 87
DRG: 659 | End: 2022-08-12
Payer: MEDICARE

## 2022-08-12 ENCOUNTER — APPOINTMENT (OUTPATIENT)
Dept: CT IMAGING | Age: 87
DRG: 659 | End: 2022-08-12
Attending: FAMILY MEDICINE
Payer: MEDICARE

## 2022-08-12 ENCOUNTER — APPOINTMENT (OUTPATIENT)
Dept: GENERAL RADIOLOGY | Age: 87
DRG: 659 | End: 2022-08-12
Attending: UROLOGY
Payer: MEDICARE

## 2022-08-12 ENCOUNTER — APPOINTMENT (OUTPATIENT)
Dept: GENERAL RADIOLOGY | Age: 87
DRG: 659 | End: 2022-08-12
Attending: FAMILY MEDICINE
Payer: MEDICARE

## 2022-08-12 LAB
AMMONIA PLAS-SCNC: 11 UMOL/L
ANION GAP SERPL CALC-SCNC: 12 MMOL/L (ref 5–15)
BASOPHILS # BLD: 0 K/UL (ref 0–0.1)
BASOPHILS NFR BLD: 0 % (ref 0–1)
BUN SERPL-MCNC: 38 MG/DL (ref 6–20)
BUN/CREAT SERPL: 20 (ref 12–20)
CALCIUM SERPL-MCNC: 8.7 MG/DL (ref 8.5–10.1)
CHLORIDE SERPL-SCNC: 105 MMOL/L (ref 97–108)
CO2 SERPL-SCNC: 21 MMOL/L (ref 21–32)
CREAT SERPL-MCNC: 1.86 MG/DL (ref 0.7–1.3)
DIFFERENTIAL METHOD BLD: ABNORMAL
EOSINOPHIL # BLD: 0 K/UL (ref 0–0.4)
EOSINOPHIL NFR BLD: 0 % (ref 0–7)
ERYTHROCYTE [DISTWIDTH] IN BLOOD BY AUTOMATED COUNT: 12.6 % (ref 11.5–14.5)
GLUCOSE SERPL-MCNC: 101 MG/DL (ref 65–100)
HCT VFR BLD AUTO: 49.6 % (ref 36.6–50.3)
HGB BLD-MCNC: 16.5 G/DL (ref 12.1–17)
IMM GRANULOCYTES # BLD AUTO: 0 K/UL (ref 0–0.04)
IMM GRANULOCYTES NFR BLD AUTO: 0 % (ref 0–0.5)
LACTATE SERPL-SCNC: 1.9 MMOL/L (ref 0.4–2)
LYMPHOCYTES # BLD: 0.7 K/UL (ref 0.8–3.5)
LYMPHOCYTES NFR BLD: 4 % (ref 12–49)
MCH RBC QN AUTO: 32.6 PG (ref 26–34)
MCHC RBC AUTO-ENTMCNC: 33.3 G/DL (ref 30–36.5)
MCV RBC AUTO: 98 FL (ref 80–99)
MONOCYTES # BLD: 1.6 K/UL (ref 0–1)
MONOCYTES NFR BLD: 10 % (ref 5–13)
NEUTS SEG # BLD: 14 K/UL (ref 1.8–8)
NEUTS SEG NFR BLD: 86 % (ref 32–75)
NRBC # BLD: 0 K/UL (ref 0–0.01)
NRBC BLD-RTO: 0 PER 100 WBC
PLATELET # BLD AUTO: 136 K/UL (ref 150–400)
PMV BLD AUTO: 10.8 FL (ref 8.9–12.9)
POTASSIUM SERPL-SCNC: 4.5 MMOL/L (ref 3.5–5.1)
PROCALCITONIN SERPL-MCNC: 0.19 NG/ML
RBC # BLD AUTO: 5.06 M/UL (ref 4.1–5.7)
RBC MORPH BLD: ABNORMAL
SODIUM SERPL-SCNC: 138 MMOL/L (ref 136–145)
TSH SERPL DL<=0.05 MIU/L-ACNC: 1.49 UIU/ML (ref 0.36–3.74)
WBC # BLD AUTO: 16.3 K/UL (ref 4.1–11.1)

## 2022-08-12 PROCEDURE — 76060000032 HC ANESTHESIA 0.5 TO 1 HR: Performed by: UROLOGY

## 2022-08-12 PROCEDURE — 0T768DZ DILATION OF RIGHT URETER WITH INTRALUMINAL DEVICE, VIA NATURAL OR ARTIFICIAL OPENING ENDOSCOPIC: ICD-10-PCS | Performed by: UROLOGY

## 2022-08-12 PROCEDURE — 74011250636 HC RX REV CODE- 250/636: Performed by: STUDENT IN AN ORGANIZED HEALTH CARE EDUCATION/TRAINING PROGRAM

## 2022-08-12 PROCEDURE — C2617 STENT, NON-COR, TEM W/O DEL: HCPCS | Performed by: UROLOGY

## 2022-08-12 PROCEDURE — 74011250637 HC RX REV CODE- 250/637: Performed by: NURSE PRACTITIONER

## 2022-08-12 PROCEDURE — 80048 BASIC METABOLIC PNL TOTAL CA: CPT

## 2022-08-12 PROCEDURE — C1769 GUIDE WIRE: HCPCS | Performed by: UROLOGY

## 2022-08-12 PROCEDURE — 83605 ASSAY OF LACTIC ACID: CPT

## 2022-08-12 PROCEDURE — 74011000250 HC RX REV CODE- 250: Performed by: STUDENT IN AN ORGANIZED HEALTH CARE EDUCATION/TRAINING PROGRAM

## 2022-08-12 PROCEDURE — 85025 COMPLETE CBC W/AUTO DIFF WBC: CPT

## 2022-08-12 PROCEDURE — 70450 CT HEAD/BRAIN W/O DYE: CPT

## 2022-08-12 PROCEDURE — 65270000029 HC RM PRIVATE

## 2022-08-12 PROCEDURE — 76210000006 HC OR PH I REC 0.5 TO 1 HR: Performed by: UROLOGY

## 2022-08-12 PROCEDURE — 82140 ASSAY OF AMMONIA: CPT

## 2022-08-12 PROCEDURE — 74420 UROGRAPHY RTRGR +-KUB: CPT

## 2022-08-12 PROCEDURE — 36415 COLL VENOUS BLD VENIPUNCTURE: CPT

## 2022-08-12 PROCEDURE — 77030019927 HC TBNG IRR CYSTO BAXT -A: Performed by: UROLOGY

## 2022-08-12 PROCEDURE — 87040 BLOOD CULTURE FOR BACTERIA: CPT

## 2022-08-12 PROCEDURE — 2709999900 HC NON-CHARGEABLE SUPPLY: Performed by: UROLOGY

## 2022-08-12 PROCEDURE — 84443 ASSAY THYROID STIM HORMONE: CPT

## 2022-08-12 PROCEDURE — 84145 PROCALCITONIN (PCT): CPT

## 2022-08-12 PROCEDURE — 71045 X-RAY EXAM CHEST 1 VIEW: CPT

## 2022-08-12 PROCEDURE — 74011250636 HC RX REV CODE- 250/636: Performed by: NURSE ANESTHETIST, CERTIFIED REGISTERED

## 2022-08-12 PROCEDURE — C1758 CATHETER, URETERAL: HCPCS | Performed by: UROLOGY

## 2022-08-12 PROCEDURE — 76010000138 HC OR TIME 0.5 TO 1 HR: Performed by: UROLOGY

## 2022-08-12 RX ORDER — SODIUM CHLORIDE 9 MG/ML
25 INJECTION, SOLUTION INTRAVENOUS CONTINUOUS
Status: DISCONTINUED | OUTPATIENT
Start: 2022-08-12 | End: 2022-08-12 | Stop reason: HOSPADM

## 2022-08-12 RX ORDER — ALBUTEROL SULFATE 0.83 MG/ML
2.5 SOLUTION RESPIRATORY (INHALATION) AS NEEDED
Status: DISCONTINUED | OUTPATIENT
Start: 2022-08-12 | End: 2022-08-12 | Stop reason: HOSPADM

## 2022-08-12 RX ORDER — MIDAZOLAM HYDROCHLORIDE 1 MG/ML
1 INJECTION, SOLUTION INTRAMUSCULAR; INTRAVENOUS AS NEEDED
Status: DISCONTINUED | OUTPATIENT
Start: 2022-08-12 | End: 2022-08-12 | Stop reason: HOSPADM

## 2022-08-12 RX ORDER — FENTANYL CITRATE 50 UG/ML
50 INJECTION, SOLUTION INTRAMUSCULAR; INTRAVENOUS AS NEEDED
Status: DISCONTINUED | OUTPATIENT
Start: 2022-08-12 | End: 2022-08-12 | Stop reason: HOSPADM

## 2022-08-12 RX ORDER — DIPHENHYDRAMINE HYDROCHLORIDE 50 MG/ML
12.5 INJECTION, SOLUTION INTRAMUSCULAR; INTRAVENOUS AS NEEDED
Status: DISCONTINUED | OUTPATIENT
Start: 2022-08-12 | End: 2022-08-12 | Stop reason: HOSPADM

## 2022-08-12 RX ORDER — TAMSULOSIN HYDROCHLORIDE 0.4 MG/1
0.4 CAPSULE ORAL DAILY
Status: DISCONTINUED | OUTPATIENT
Start: 2022-08-12 | End: 2022-08-16 | Stop reason: HOSPADM

## 2022-08-12 RX ORDER — DORZOLAMIDE HCL 20 MG/ML
1 SOLUTION/ DROPS OPHTHALMIC 2 TIMES DAILY
Status: DISCONTINUED | OUTPATIENT
Start: 2022-08-12 | End: 2022-08-16 | Stop reason: HOSPADM

## 2022-08-12 RX ORDER — ALBUTEROL SULFATE 0.83 MG/ML
2.5 SOLUTION RESPIRATORY (INHALATION)
Status: DISCONTINUED | OUTPATIENT
Start: 2022-08-12 | End: 2022-08-16 | Stop reason: HOSPADM

## 2022-08-12 RX ORDER — TIMOLOL MALEATE 5 MG/ML
1 SOLUTION/ DROPS OPHTHALMIC DAILY
Status: DISCONTINUED | OUTPATIENT
Start: 2022-08-13 | End: 2022-08-16 | Stop reason: HOSPADM

## 2022-08-12 RX ORDER — SODIUM CHLORIDE, SODIUM LACTATE, POTASSIUM CHLORIDE, CALCIUM CHLORIDE 600; 310; 30; 20 MG/100ML; MG/100ML; MG/100ML; MG/100ML
INJECTION, SOLUTION INTRAVENOUS
Status: DISCONTINUED | OUTPATIENT
Start: 2022-08-12 | End: 2022-08-12 | Stop reason: HOSPADM

## 2022-08-12 RX ORDER — ACETAMINOPHEN 325 MG/1
650 TABLET ORAL ONCE
Status: DISCONTINUED | OUTPATIENT
Start: 2022-08-12 | End: 2022-08-12 | Stop reason: HOSPADM

## 2022-08-12 RX ORDER — MIDAZOLAM HYDROCHLORIDE 1 MG/ML
0.5 INJECTION, SOLUTION INTRAMUSCULAR; INTRAVENOUS
Status: DISCONTINUED | OUTPATIENT
Start: 2022-08-12 | End: 2022-08-12 | Stop reason: HOSPADM

## 2022-08-12 RX ORDER — ONDANSETRON 2 MG/ML
4 INJECTION INTRAMUSCULAR; INTRAVENOUS AS NEEDED
Status: DISCONTINUED | OUTPATIENT
Start: 2022-08-12 | End: 2022-08-12 | Stop reason: HOSPADM

## 2022-08-12 RX ORDER — LIDOCAINE HYDROCHLORIDE 10 MG/ML
0.1 INJECTION, SOLUTION EPIDURAL; INFILTRATION; INTRACAUDAL; PERINEURAL AS NEEDED
Status: DISCONTINUED | OUTPATIENT
Start: 2022-08-12 | End: 2022-08-12 | Stop reason: HOSPADM

## 2022-08-12 RX ORDER — HEPARIN SODIUM 5000 [USP'U]/ML
5000 INJECTION, SOLUTION INTRAVENOUS; SUBCUTANEOUS EVERY 8 HOURS
Status: DISCONTINUED | OUTPATIENT
Start: 2022-08-12 | End: 2022-08-16 | Stop reason: HOSPADM

## 2022-08-12 RX ORDER — NYSTATIN AND TRIAMCINOLONE ACETONIDE 100000; 1 [USP'U]/G; MG/G
OINTMENT TOPICAL 2 TIMES DAILY
Status: CANCELLED | OUTPATIENT
Start: 2022-08-12

## 2022-08-12 RX ORDER — HYDROCODONE BITARTRATE AND ACETAMINOPHEN 5; 325 MG/1; MG/1
1 TABLET ORAL AS NEEDED
Status: DISCONTINUED | OUTPATIENT
Start: 2022-08-12 | End: 2022-08-12 | Stop reason: HOSPADM

## 2022-08-12 RX ORDER — PROPOFOL 10 MG/ML
INJECTION, EMULSION INTRAVENOUS AS NEEDED
Status: DISCONTINUED | OUTPATIENT
Start: 2022-08-12 | End: 2022-08-12 | Stop reason: HOSPADM

## 2022-08-12 RX ORDER — HYDROMORPHONE HYDROCHLORIDE 1 MG/ML
0.2 INJECTION, SOLUTION INTRAMUSCULAR; INTRAVENOUS; SUBCUTANEOUS
Status: DISCONTINUED | OUTPATIENT
Start: 2022-08-12 | End: 2022-08-12 | Stop reason: HOSPADM

## 2022-08-12 RX ORDER — ATORVASTATIN CALCIUM 40 MG/1
40 TABLET, FILM COATED ORAL DAILY
Status: DISCONTINUED | OUTPATIENT
Start: 2022-08-13 | End: 2022-08-16 | Stop reason: HOSPADM

## 2022-08-12 RX ORDER — MORPHINE SULFATE 2 MG/ML
2 INJECTION, SOLUTION INTRAMUSCULAR; INTRAVENOUS
Status: DISCONTINUED | OUTPATIENT
Start: 2022-08-12 | End: 2022-08-12 | Stop reason: HOSPADM

## 2022-08-12 RX ORDER — SODIUM CHLORIDE, SODIUM LACTATE, POTASSIUM CHLORIDE, CALCIUM CHLORIDE 600; 310; 30; 20 MG/100ML; MG/100ML; MG/100ML; MG/100ML
1000 INJECTION, SOLUTION INTRAVENOUS CONTINUOUS
Status: DISCONTINUED | OUTPATIENT
Start: 2022-08-12 | End: 2022-08-12 | Stop reason: HOSPADM

## 2022-08-12 RX ORDER — LATANOPROST 50 UG/ML
1 SOLUTION/ DROPS OPHTHALMIC
Status: DISCONTINUED | OUTPATIENT
Start: 2022-08-12 | End: 2022-08-16 | Stop reason: HOSPADM

## 2022-08-12 RX ORDER — ROPIVACAINE HYDROCHLORIDE 5 MG/ML
30 INJECTION, SOLUTION EPIDURAL; INFILTRATION; PERINEURAL AS NEEDED
Status: DISCONTINUED | OUTPATIENT
Start: 2022-08-12 | End: 2022-08-12 | Stop reason: HOSPADM

## 2022-08-12 RX ORDER — METOPROLOL TARTRATE 25 MG/1
12.5 TABLET, FILM COATED ORAL DAILY
Status: DISCONTINUED | OUTPATIENT
Start: 2022-08-13 | End: 2022-08-16 | Stop reason: HOSPADM

## 2022-08-12 RX ORDER — FENTANYL CITRATE 50 UG/ML
25 INJECTION, SOLUTION INTRAMUSCULAR; INTRAVENOUS
Status: DISCONTINUED | OUTPATIENT
Start: 2022-08-12 | End: 2022-08-12 | Stop reason: HOSPADM

## 2022-08-12 RX ORDER — SODIUM CHLORIDE 9 MG/ML
75 INJECTION, SOLUTION INTRAVENOUS CONTINUOUS
Status: DISCONTINUED | OUTPATIENT
Start: 2022-08-12 | End: 2022-08-15

## 2022-08-12 RX ORDER — SODIUM CHLORIDE 0.9 % (FLUSH) 0.9 %
5-10 SYRINGE (ML) INJECTION AS NEEDED
Status: DISCONTINUED | OUTPATIENT
Start: 2022-08-12 | End: 2022-08-16 | Stop reason: HOSPADM

## 2022-08-12 RX ORDER — FENTANYL CITRATE 50 UG/ML
INJECTION, SOLUTION INTRAMUSCULAR; INTRAVENOUS AS NEEDED
Status: DISCONTINUED | OUTPATIENT
Start: 2022-08-12 | End: 2022-08-12 | Stop reason: HOSPADM

## 2022-08-12 RX ORDER — GLYCOPYRROLATE 0.2 MG/ML
0.2 INJECTION INTRAMUSCULAR; INTRAVENOUS
Status: DISCONTINUED | OUTPATIENT
Start: 2022-08-12 | End: 2022-08-12 | Stop reason: HOSPADM

## 2022-08-12 RX ADMIN — TAMSULOSIN HYDROCHLORIDE 0.4 MG: 0.4 CAPSULE ORAL at 09:59

## 2022-08-12 RX ADMIN — HEPARIN SODIUM 5000 UNITS: 5000 INJECTION INTRAVENOUS; SUBCUTANEOUS at 22:22

## 2022-08-12 RX ADMIN — SODIUM CHLORIDE, POTASSIUM CHLORIDE, SODIUM LACTATE AND CALCIUM CHLORIDE: 600; 310; 30; 20 INJECTION, SOLUTION INTRAVENOUS at 15:55

## 2022-08-12 RX ADMIN — PROPOFOL 30 MG: 10 INJECTION, EMULSION INTRAVENOUS at 16:16

## 2022-08-12 RX ADMIN — PROPOFOL 30 MG: 10 INJECTION, EMULSION INTRAVENOUS at 16:00

## 2022-08-12 RX ADMIN — PROPOFOL 30 MG: 10 INJECTION, EMULSION INTRAVENOUS at 16:09

## 2022-08-12 RX ADMIN — FENTANYL CITRATE 50 MCG: 50 INJECTION, SOLUTION INTRAMUSCULAR; INTRAVENOUS at 16:11

## 2022-08-12 RX ADMIN — PROPOFOL 20 MG: 10 INJECTION, EMULSION INTRAVENOUS at 16:10

## 2022-08-12 RX ADMIN — DORZOLAMIDE HYDROCHLORIDE 1 DROP: 20 SOLUTION/ DROPS OPHTHALMIC at 18:29

## 2022-08-12 RX ADMIN — PROPOFOL 30 MG: 10 INJECTION, EMULSION INTRAVENOUS at 16:05

## 2022-08-12 RX ADMIN — SODIUM CHLORIDE 75 ML/HR: 9 INJECTION, SOLUTION INTRAVENOUS at 15:04

## 2022-08-12 RX ADMIN — SODIUM CHLORIDE 1 G: 9 INJECTION INTRAMUSCULAR; INTRAVENOUS; SUBCUTANEOUS at 22:22
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
## 2022-08-12 NOTE — PROGRESS NOTES
Order acknowledged and chart reviewed. Attempted to see pt for OT eval. Pt confused and difficult to follow commands. Transport arrived to take pt for a procedure. Will follow-up on tomorrow if appropriate. Thanks!

## 2022-08-12 NOTE — PROGRESS NOTES
Physical Therapy    Order acknowledged, chart reviewed. Attempted PT evaluation. Pt confused with difficulty following commands. Session aborted due to arrival of transport to take pt for procedure. Will continue to follow as appropriate.     Kenisha Lanza, PT, MPT

## 2022-08-12 NOTE — PERIOP NOTES
Daughter Neal Navas reports patient confused since onset of N/V and admission, she was told she states its related to infection, patient refused EKG and to undress, he is pleasant but confused, she is at bedside.

## 2022-08-12 NOTE — CONSULTS
Requesting Provider: Kurtis Geronimo MD - Reason for Consultation: \"stone\"  Pre-existing [facility] Patient:   No                Patient: Richard Feliciano MRN: [id number]  SSN: [id number]    YOB: 1930  Age: 80 y.o. Sex: male     Location: [facility]/01       Code Status: Full Code   PCP: None  - None   Emergency Contact:  Primary Emergency Contact: Chai Moncada, Home Phone: [phone]   Race/Moravian/Language: Ashanti Cloud / Zitri Kenyon / Oneda Rend   Payor: Payor: José Miguel Jay / Plan: [employer] / Product Type: Medicare /    Prior Admission Data: 4/26/21 [facility] [address] [place]   Hospitalized:  Hospital Day: 2 - Admitted 8/11/2022  3:17 PM     CONSULTANTS  IP CONSULT TO [address] [place] [address]    ICD-10-CM ICD-9-CM   1. YAYA (acute kidney injury) ([facility] [address]  N17.9 584.9   2. Right ureteral stone  N20.1 592.1   3. Dehydration  E86.0 276.51         Assessment/Plan:       81 yo M with Right 2 mm UVJ calculus, right hydro. Notable leukocytosis, YAYA. Abnormal UA  WBC. - Discussed right stent vs. conservative management with close monitoring for the next 12-24 hrs with the patient and his daughter. Elected to proceed with right stent placement. Risks including anesthesia, infection, ureteral injury, hematuria all discussed. Agreeable to proceed. For right stent today. Maintain NPO. Empiric abx, follow cultures, supportive care. Start Flomax and strain urine in the meantime. Discussed with Dr. Philip Bolton     CC: Abdominal Pain and Vomiting   HPI: He is a 80 y.o. male with PMH as below who is admitted for YAYA and seen in consult by Urology as a new patient to  for an obstructing stone. Hx collection somewhat limited, inconsistent answers to questions. Discussed with his daughter on the phone. His daughter reports N/V x 2 days and vague complaints of abdominal pain. Review of Non con CT showed mild right hydro to a 2 mm right UVJ stone.  He is AF with stable VS. Labs initially showed a WBC of 15.6, lactic 1.9,  Cr 2.04 from 1.20 prior in April, UA   WBC, no bacteria. He was started on empiric rocephin. His WBC increased to 16.3 and his creatinine improved to 1.86.      Temp (24hrs), Av.9 °F (36.6 °C), Min:97.5 °F (36.4 °C), Max:98.3 °F (36.8 °C)    Urinary Status: Urgency  Creatinine   Date/Time Value Ref Range Status   2022 03:54 AM 1.86 (H) 0.70 - 1.30 MG/DL Final   2022 03:34 PM 2.04 (H) 0.70 - 1.30 MG/DL Final   2021 02:18 PM 1.20 0.70 - 1.30 MG/DL Final   2021 09:59 AM 1.32 (H) 0.70 - 1.30 MG/DL Final     Current Antimicrobial Therapy (168h ago, onward)       Ordered     Start Stop    22 0748  cefTRIAXone (ROCEPHIN) 1 g in 0.9% sodium chloride 10 mL IV syringe  1 g,   IntraVENous,   EVERY 24 HOURS        References:    Lexicomp    22                  Key Anti-Platelet Anticoagulant Meds               clopidogrel bisulfate (PLAVIX PO) Plavix          Diet: DIET NPO -       Labs     Lab Results   Component Value Date/Time    Lactic acid 1.9 2022 03:54 AM    WBC 16.3 (H) 2022 03:54 AM    HCT 49.6 2022 03:54 AM    PLATELET 065 (L) 42/15/5429 03:54 AM    Sodium 138 2022 03:54 AM    Potassium 4.5 2022 03:54 AM    Chloride 105 2022 03:54 AM    CO2 21 2022 03:54 AM    BUN 38 (H) 2022 03:54 AM    Creatinine 1.86 (H) 2022 03:54 AM    Glucose 101 (H) 2022 03:54 AM    Calcium 8.7 2022 03:54 AM    INR 1.1 2021 09:48 PM     UA:   Lab Results   Component Value Date/Time    Color YELLOW/STRAW 2022 08:32 PM    Appearance HAZY (A) 2022 08:32 PM    Specific gravity 1.023 2022 08:32 PM    pH (UA) 5.5 2022 08:32 PM    Protein 100 (A) 2022 08:32 PM    Glucose Negative 2022 08:32 PM    Ketone 40 (A) 2022 08:32 PM    Urobilinogen 0.2 2022 08:32 PM    Nitrites Negative 2022 08:32 PM    Leukocyte Esterase MODERATE (A) 08/11/2022 08:32 PM    Epithelial cells FEW 08/11/2022 08:32 PM    Bacteria Negative 08/11/2022 08:32 PM    WBC  08/11/2022 08:32 PM    RBC 5-10 08/11/2022 08:32 PM     Imaging     Results for orders placed during the hospital encounter of 08/11/22    CT HEAD WO CONT    Narrative  EXAM:  CT HEAD WO CONT    INDICATION: Altered mental status    COMPARISON: None. TECHNIQUE: Axial noncontrast head CT from foramen magnum to vertex. Coronal and  sagittal reformatted images were obtained. CT dose reduction was achieved  through use of a standardized protocol tailored for this examination and  automatic exposure control for dose modulation. Adaptive statistical iterative  reconstruction (ASIR) was utilized. FINDINGS:  There is diffuse age-related parenchymal volume loss. The ventricles  and sulci are age-appropriate without hydrocephalus. There is no mass effect or  midline shift. There is no intracranial hemorrhage or extra-axial fluid  collection. Scattered foci of low attenuation in the periventricular white  matter most likely represent age-indeterminate microvascular ischemic changes. The gray-white matter differentiation is maintained. The basal cisterns are  patent. The osseous structures are intact. There is near complete opacification of the  right sphenoid sinus. The remaining paranasal sinuses and mastoid air cells are  clear. Impression  No acute intracranial abnormality. Age-indeterminate microvascular ischemic  disease in the periventricular white matter. Near-complete opacification of the  right sphenoid sinus. US Results (most recent):  No results found for this or any previous visit.       Cultures     All Micro Results       Procedure Component Value Units Date/Time    CULTURE, BLOOD [[id number]] Collected: 08/12/22 0356    Order Status: Sent Specimen: Blood Updated: 08/12/22 0638    CULTURE, URINE [[id number]] Collected: 08/11/22 2032    Order Status: No result Updated: 08/11/22 2105    CULTURE, URINE [[id number]] Collected: 08/11/22 2100    Order Status: Canceled Specimen: Urine from Clean catch              Past History: (Complete 2+/3 areas)     Allergies   Allergen Reactions    Penicillins Unknown (comments)      Current Facility-Administered Medications   Medication Dose Route Frequency    sodium chloride (NS) flush 5-10 mL  5-10 mL IntraVENous PRN    cefTRIAXone (ROCEPHIN) 1 g in 0.9% sodium chloride 10 mL IV syringe  1 g IntraVENous Q24H    tamsulosin (FLOMAX) capsule 0.4 mg  0.4 mg Oral DAILY    Prior to Admission medications    Medication Sig Start Date End Date Taking? Authorizing Provider   atorvastatin (LIPITOR) 40 mg tablet Take 40 mg by mouth in the morning. 8/1/22  Yes Other, MD Desiree   brinzolamide (AZOPT) 1 % ophthalmic suspension Apply  to eye. Yes Other, MD Desiree   Vyzulta 0.024 % drop INSTILL 1 DROP IN LEFT EYE EVERY DAY IN THE EVENING 7/11/22  Yes Other, MD Desiree   latanoprost (XALATAN) 0.005 % ophthalmic solution INSTILL 1 DROP IN RIGHT EYE EVERY DAY IN THE EVENING 7/14/22  Yes Other, MD Desiree   metoprolol tartrate (LOPRESSOR) 25 mg tablet Take 12.5 mg by mouth in the morning. 8/1/22  Yes Other, MD Desiree   Rhopressa 0.02 % drop INSTILL 1 DROP IN LEFT EYE EVERY DAY IN THE EVENING 7/8/22  Yes Other, MD Desiree   timolol (TIMOPTIC) 0.5 % ophthalmic solution INSTILL 1 DROP IN BOTH EYES EVERY MORNING 7/1/22  Yes Other, MD Desiree   albuterol (PROVENTIL VENTOLIN) 2.5 mg /3 mL (0.083 %) nebu 3 mL. Other, MD Desiree   clopidogrel bisulfate (PLAVIX PO) Plavix    Other, MD Desiree   ofloxacin (FLOXIN) 0.3 % ophthalmic solution INSTILL 1 DROP INTO EYE FOUR TIMES A DAY. START 2 DAYS PRIOR TO SURGERY  Patient not taking: Reported on 8/11/2022 6/29/22   Other, MD Desiree   prednisoLONE acetate (PRED FORTE) 1 % ophthalmic suspension  6/29/22   Other, MD Desiree        PMHx:  has a past medical history of Blindness, Glaucoma, HTN (hypertension), and MI (myocardial infarction) ([facility] PSurgHx:  has a past surgical history that includes hx mitral valve replacement. PSocHx:  reports that he has quit smoking. He has never used smokeless tobacco. He reports current alcohol use. He reports that he does not use drugs.    ROS:  (Complete - 10 systems) - DENIES: Weightloss (Constitutional), Dry mouth (ENMT), Chest pain (CV), SOB (Respiratory), Constipation (GI), Weakness (MS), Pallor (Skin), TIA Sx (Neuro), Confusion (Psych), Easy bruising (Heme)    Physical Exam: (Comprehesive - 8+ 1995 Systems)     (1) Constitutional:  NAD; pleasant  FIO2:   on SpO2: O2 Sat (%): 96 %  O2 Device: None (Room air)    Patient Vitals for the past 24 hrs:   BP Temp Pulse Resp SpO2 Height   08/12/22 0817 (!) 150/74 97.5 °F (36.4 °C) 79 20 96 % --   08/12/22 0608 -- -- 69 -- -- --   08/12/22 0433 (!) 172/70 98 °F (36.7 °C) 71 18 94 % --   08/12/22 0404 -- -- 76 -- -- --   08/12/22 0059 (!) 172/85 97.8 °F (36.6 °C) 75 20 95 % --   08/11/22 2342 (!) 163/83 98.3 °F (36.8 °C) 78 26 95 % --   08/11/22 2327 (!) 153/83 -- 80 24 94 % --   08/11/22 2312 (!) 165/95 -- 89 27 95 % --   08/11/22 2257 -- -- 93 18 (!) 75 % --   08/11/22 2242 -- -- 71 23 93 % --   08/11/22 2227 -- -- 75 23 93 % --   08/11/22 2212 -- -- 75 26 92 % --   08/11/22 2157 -- -- 75 27 93 % --   08/11/22 1835 (!) 190/86 -- 79 16 95 % --   08/11/22 1527 (!) 145/78 97.8 °F (36.6 °C) 89 16 97 % 5' 6\" (1.676 m)          (2) ENMT:   moist mucous membranes, normal sinuses   (3) Respiratory:  breathing easily, no distress   (4) GI:  no abdominal masses, guarded with palpation    (5) :   Voiding independently,  no CVA tenderness   (6) Lymphatic:  no adenopathy, neck supple   (7) Muscloskeletal:  no gross deformity, normal ROM   (8) Skin:  no rash, warm & dry   (9) Neuro:  Alert,  normal speech      Signed By: Vladimir Lovelace NP  - August 12, 2022

## 2022-08-12 NOTE — PROGRESS NOTES
[facility] Adult  Hospitalist Group                                                                                          Hospitalist Progress Note  Trinity Lizarraga MD  Answering service: [phone] from in house phone        Date of Service:  2022  NAME:  Aimee Sousa  :  1930  MRN:  [id number]      Admission Summary:   HPI: \"Osmar Aguirre is a 80 y.o. male with a pmhx htn, CAD with past MI, glaucoma, and blindness who presents with nausea and vomiting, and abdominal pain, and was transferred from [facility] for admission for obstructive nephrolithiasis with YAYA. History obtained from chart review due to patient only oriented to self at this time. In the ED, VSS. Labs showed WBC 15.6, creatinine 2.03, (b/l 1.2-1.3), UA with moderate blood, mod LE 50-10 WBC, and negative for bacteria. CT abdomen/pelvis with 2mm stone in the right ureterovesicular junction with mild right hydroureteronephrosis. \"        Interval history / Subjective:   Patient seen examined at bedside earlier. ANO x1 to name. Daughter present at bedside.   Significantly more confused from his baseline per her, plan for ureteral stent placement later this afternoon      Assessment & Plan:     Acute metabolic encephalopathy  Pyelonephritis with nephrolithiasis  YAYA  - CT abdomen pelvis confirming a 2 mm UVJ calculus with evidence of right hydro  -Appreciate urology recommendations, plan for ureteral stent placement   -Continue IV Rocephin  - Follow cultures, will tailor antibiotic therapy based on cultures  -Continue IV fluid  - I's and O's  -Add on TSH, B12 folate,CT head negative  PT OT            Code status: dnr discussed with patient's daughter who is medical POA  Prophylaxis: Heparin subcu  Care Plan discussed with: Patient/family, nurse, case management  Anticipated Disposition: Greater than 48 hours     Hospital Problems  Never Reviewed            Codes Class Noted POA    Nephrolithiasis ICD-10-CM: N20.0  ICD-9-CM: 592.0  8/11/2022 Unknown        YAYA (acute kidney injury) [facility]) ICD-10-CM: N17.9  ICD-9-CM: 584.9  8/11/2022 Unknown             Review of Systems:   A comprehensive review of systems was negative except for that written in the HPI. Vital Signs:    Last 24hrs VS reviewed since prior progress note. Most recent are:  Visit Vitals  BP (!) 135/92   Pulse 77   Temp 97.5 °F (36.4 °C)   Resp 20   Ht 5' 6\" (1.676 m)   SpO2 96%   BMI 20.74 kg/m²       No intake or output data in the 24 hours ending 08/12/22 1343     Physical Examination:     I had a face to face encounter with this patient and independently examined them on 8/12/2022 as outlined below:          Constitutional:  No acute distress, cooperative, pleasant    ENT:  Oral mucosa moist, oropharynx benign. Resp:  CTA bilaterally. No wheezing/rhonchi/rales. No accessory muscle use. CV:  Regular rhythm, normal rate, no murmurs, gallops, rubs    GI:  Soft, non distended, non tender. normoactive bowel sounds, no hepatosplenomegaly     Musculoskeletal:  No edema, warm, 2+ pulses throughout    Neurologic:  Moves all extremities. AAOx1, CN II-XII reviewed            Data Review:    Review and/or order of clinical lab test  Review and/or order of tests in the radiology section of CPT  Review and/or order of tests in the medicine section of CPT      Labs:     Recent Labs     08/12/22  0354 08/11/22  1534   WBC 16.3* 15.6*   HGB 16.5 15.0   HCT 49.6 46.0   * 134*     Recent Labs     08/12/22  0354 08/11/22  1534    136   K 4.5 4.6    101   CO2 21 20*   BUN 38* 39*   CREA 1.86* 2.04*   * 126*   CA 8.7 8.6     Recent Labs     08/11/22  1534   ALT 25      TBILI 0.8   TP 7.4   ALB 3.3*   GLOB 4.1*     No results for input(s): INR, PTP, APTT, INREXT in the last 72 hours. No results for input(s): FE, TIBC, PSAT, FERR in the last 72 hours.    No results found for: FOL, RBCF   No results for input(s): PH, PCO2, PO2 in the last 72 hours. No results for input(s): CPK, CKNDX, TROIQ in the last 72 hours.     No lab exists for component: CPKMB  No results found for: CHOL, CHOLX, CHLST, CHOLV, HDL, HDLP, LDL, LDLC, DLDLP, TGLX, TRIGL, TRIGP, CHHD, CHHDX  No results found for: [facility]  Lab Results   Component Value Date/Time    Color YELLOW/STRAW 08/11/2022 08:32 PM    Appearance HAZY (A) 08/11/2022 08:32 PM    Specific gravity 1.023 08/11/2022 08:32 PM    pH (UA) 5.5 08/11/2022 08:32 PM    Protein 100 (A) 08/11/2022 08:32 PM    Glucose Negative 08/11/2022 08:32 PM    Ketone 40 (A) 08/11/2022 08:32 PM    Urobilinogen 0.2 08/11/2022 08:32 PM    Nitrites Negative 08/11/2022 08:32 PM    Leukocyte Esterase MODERATE (A) 08/11/2022 08:32 PM    Epithelial cells FEW 08/11/2022 08:32 PM    Bacteria Negative 08/11/2022 08:32 PM    WBC  08/11/2022 08:32 PM    RBC 5-10 08/11/2022 08:32 PM         Medications Reviewed:     Current Facility-Administered Medications   Medication Dose Route Frequency    sodium chloride (NS) flush 5-10 mL  5-10 mL IntraVENous PRN    cefTRIAXone (ROCEPHIN) 1 g in 0.9% sodium chloride 10 mL IV syringe  1 g IntraVENous Q24H    tamsulosin (FLOMAX) capsule 0.4 mg  0.4 mg Oral DAILY     ______________________________________________________________________  EXPECTED LENGTH OF STAY: - - -  ACTUAL LENGTH OF STAY:          1                 Pop Cordova MD [facility]DL

## 2022-08-12 NOTE — BRIEF OP NOTE
Brief Postoperative Note    Patient: Pema Beltran  YOB: 1930  MRN: [id number]    Date of Procedure: 8/12/2022     Pre-Op Diagnosis:  Right ureteral stone    Post-Op Diagnosis: Same as preoperative diagnosis.   Bladder stones, balanitis    Procedure(s):  CYSTOSCOPY, RIGHT URETERAL STENT PLACEMENT    Surgeon(s):  Sintia Hudson MD    Surgical Assistant: None    Anesthesia: General     Estimated Blood Loss (mL): Minimal    Complications: None    Specimens: * No specimens in log *     Implants:  right 6 Fr x 24 cm JJ ureteral stent with short string in the bladder    Drains: * No LDAs found *    Findings: impacted right UVJ stone, numerous tiny bladder stones, balanitis    Electronically Signed by Karen Tariq MD on 8/12/2022 at 3:45 PM

## 2022-08-12 NOTE — ED PROVIDER NOTES
Patient is a 26-year-old gentleman who comes into the emergency department with his daughter with nausea and vomiting that he has had for the last 2 days. He also reports some mild 3 out of 10 abdominal pain. His daughter brought him in because she was concerned that he was not keeping up fluids down and becoming dehydrated. He has not had any fevers or diarrhea and there have not been any other members of the family with similar symptoms. The history is provided by the patient and a relative. Past Medical History:   Diagnosis Date    Blindness     Glaucoma     HTN (hypertension)     MI (myocardial infarction) ([facility] [address]        Past Surgical History:   Procedure Laterality Date    HX MITRAL VALVE REPLACEMENT           History reviewed. No pertinent family history. Social History     Socioeconomic History    Marital status:      Spouse name: Not on file    Number of children: Not on file    Years of education: Not on file    Highest education level: Not on file   Occupational History    Not on file   Tobacco Use    Smoking status: Former    Smokeless tobacco: Never   Substance and Sexual Activity    Alcohol use: Yes     Comment: Wine on occasion    Drug use: Never    Sexual activity: Not on file   Other Topics Concern    Not on file   Social History Narrative    Not on file     Social Determinants of Health     Financial Resource Strain: Not on file   Food Insecurity: Not on file   Transportation Needs: Not on file   Physical Activity: Not on file   Stress: Not on file   Social Connections: Not on file   Intimate Partner Violence: Not on file   Housing Stability: Not on file         ALLERGIES: Penicillins    Review of Systems   Gastrointestinal:  Positive for abdominal pain and vomiting.      Vitals:    08/11/22 1527 08/11/22 1835   BP: (!) 145/78 (!) 190/86   Pulse: 89 79   Resp: 16 16   Temp: 97.8 °F (36.6 °C)    SpO2: 97% 95%   Height: 5' 6\" (1.676 m)             Physical Exam  Vitals and nursing note reviewed. Constitutional:       General: He is not in acute distress. Appearance: He is well-developed. HENT:      Head: Normocephalic and atraumatic. Eyes:      Conjunctiva/sclera: Conjunctivae normal.      Pupils: Pupils are equal, round, and reactive to light. Cardiovascular:      Rate and Rhythm: Normal rate and regular rhythm. Pulmonary:      Effort: Pulmonary effort is normal.      Breath sounds: Wheezing present. Abdominal:      General: There is no distension. Palpations: Abdomen is soft. Tenderness: There is no abdominal tenderness. Musculoskeletal:         General: Normal range of motion. Cervical back: Normal range of motion. Skin:     General: Skin is warm and dry. Capillary Refill: Capillary refill takes less than 2 seconds. Neurological:      General: No focal deficit present. Mental Status: He is alert and oriented to person, place, and time. Psychiatric:         Mood and Affect: Mood normal.         Behavior: Behavior normal.        MDM         Procedures        MEDICAL DECISION MAKIN y.o. male presents with Abdominal Pain and Vomiting    Differential diagnosis includes but not limited to:  acute appendicitis, bowel obstruction, incarcerated hernia, acute cholecystitis, bowel perforation, major gastrointestinal bleeding, severe diverticulitis, sepsis, ureteral stone    LABORATORY TESTS:  Labs Reviewed   CBC WITH AUTOMATED DIFF - Abnormal; Notable for the following components:       Result Value    WBC 15.6 (*)     PLATELET 330 (*)     NEUTROPHILS 89 (*)     LYMPHOCYTES 2 (*)     ABS. NEUTROPHILS 13.9 (*)     ABS. LYMPHOCYTES 0.4 (*)     ABS. MONOCYTES 1.2 (*)     ABS. IMM.  GRANS. 0.1 (*)     All other components within normal limits   METABOLIC PANEL, COMPREHENSIVE - Abnormal; Notable for the following components:    CO2 20 (*)     Glucose 126 (*)     BUN 39 (*)     Creatinine 2.04 (*)     GFR est AA 37 (*)     GFR est non-AA 31 (*) Albumin 3.3 (*)     Globulin 4.1 (*)     A-G Ratio 0.8 (*)     All other components within normal limits   SAMPLES BEING HELD   URINALYSIS W/ REFLEX CULTURE       IMAGING RESULTS:  CT ABD PELV WO CONT   Final Result      1. There is a 2 mm stone at the RIGHT ureterovesicular junction which is causing   mild right-sided hydroureteronephrosis. 2. Status post cholecystectomy. MEDICATIONS GIVEN:  Medications   sodium chloride 0.9 % bolus infusion 500 mL (500 mL IntraVENous New Bag 8/11/22 1928)       PROGRESS NOTE:   The patient's ED course has been uncomplicated      Patient is being admitted to the hospital.  The results of their tests and reasons for their admission have been discussed with them and/or available family. They convey agreement and understanding for the need to be admitted and for their admission diagnosis. Consultation will be made now with the inpatient physician for hospitalization. CONSULTS:  Hospitalist Consult: [facility] for Admission  8:32 PM    ED Room Number: SER02/02  Patient Name and age: Renaldo Freed 80 y.o.  male  Working Diagnosis:   1. YAYA (acute kidney injury) ([address]    2. Right ureteral stone    3. Dehydration        COVID-19 Suspicion:  no  Sepsis present:  no  Reassessment needed: no  Code Status:  Full Code  Readmission: no  Isolation Requirements:  no  Recommended Level of Care:  telemetry  Department:[facility] Adult ED - 21       Noel Nicolas MD      Please note that this dictation was completed with Ibe[employer], the computer voice recognition software. Quite often unanticipated grammatical, syntax, homophones, and other interpretive errors are inadvertently transcribed by the computer software. Please disregard these errors. Please excuse any errors that have escaped final proofreading.

## 2022-08-12 NOTE — CONSULTS
Pt seen and examined  Appears confused - history he gives is questionable. Admitted for 2mm right UVJ calculus (unable to view CT due to computer issues)  WBC up slightly to 16   Cr down to 1.86  Lactic acid 1.9  UA - no bacteria  Afebrile and VSS  Pt not currently c/o pain    I discussed possible stent with patient and discussed this with daughter as well. Will try and arrange stent placement as soon as possible.

## 2022-08-12 NOTE — ACP (ADVANCE CARE PLANNING)
Advance Care Planning     Advance Care Planning (ACP) Physician/NP/PA Conversation      Date of Conversation: 8/11/2022  Conducted with: Healthcare Decision Maker: Named in Advance Directive or Healthcare Power of [address]:     Primary Decision Maker: Aleks Davis - [phone]  Click here to complete [address] including selection of the Healthcare Decision Maker Relationship (ie \"Primary\")    Today we documented Decision Maker(s) consistent with Legal Next of Kin hierarchy. Care Preferences:    Hospitalization: \"If your health worsens and it becomes clear that your chance of recovery is unlikely, what would be your preference regarding hospitalization? \"  The patient would prefer comfort-focused treatment without hospitalization. Ventilation: \"If you were unable to breathe on your own and your chance of recovery was unlikely, what would be your preference about the use of a ventilator (breathing machine) if it was available to you? \"   The patient would NOT desire the use of a ventilator. Resuscitation: \"In the event your heart stopped as a result of an underlying serious health condition, would you want attempts to be made to restart your heart, or would you prefer a natural death? \"   No, do NOT attempt to resuscitate.     Additional topics discussed: treatment goals, benefit/burden of treatment options, ventilation preferences, hospitalization preferences, resuscitation preferences, and end of life care preferences (vegetative state/imminent death)    Conversation Outcomes / Follow-Up Plan:   ACP complete - no further action today  Reviewed DNR/DNI and patient confirms current DNR status - completed forms on file (place new order if needed)     Length of Voluntary ACP Conversation in minutes:  16 minutes    Royce Talavera MD

## 2022-08-12 NOTE — H&P
[address] [facility] Adult  Hospitalist Group  History and Physical    Date of Service:  8/12/2022  Primary Care Provider: None  Source of information: Chart review    Chief Complaint: Abdominal Pain and Vomiting      History of Presenting Illness: Gladis Juárez is a 80 y.o. male with a pmhx htn, CAD with past MI, glaucoma, and blindness who presents with nausea and vomiting, and abdominal pain, and was transferred from [facility] for admission for obstructive nephrolithiasis with YAYA. History obtained from chart review due to patient only oriented to self at this time. In the ED, VSS. Labs showed WBC 15.6, creatinine 2.03, (b/l 1.2-1.3), UA with moderate blood, mod LE 50-10 WBC, and negative for bacteria. CT abdomen/pelvis with 2mm stone in the right ureterovesicular junction with mild right hydroureteronephrosis. In the ED, he received 500cc bolus, and ceftriaxone     REVIEW OF SYSTEMS:  Review of systems not obtained due to patient factors. Past Medical History:   Diagnosis Date    Blindness     Glaucoma     HTN (hypertension)     MI (myocardial infarction) ([facility] [address]       Past Surgical History:   Procedure Laterality Date    HX MITRAL VALVE REPLACEMENT       Prior to Admission medications    Medication Sig Start Date End Date Taking? Authorizing Provider   atorvastatin (LIPITOR) 40 mg tablet Take 40 mg by mouth in the morning. 8/1/22  Yes Other, MD Desiree   brinzolamide (AZOPT) 1 % ophthalmic suspension Apply  to eye. Yes Other, MD Desiree   Vyzulta 0.024 % drop INSTILL 1 DROP IN LEFT EYE EVERY DAY IN THE EVENING 7/11/22  Yes Other, MD Desiree   latanoprost (XALATAN) 0.005 % ophthalmic solution INSTILL 1 DROP IN RIGHT EYE EVERY DAY IN THE EVENING 7/14/22  Yes Other, MD Desiree   metoprolol tartrate (LOPRESSOR) 25 mg tablet Take 12.5 mg by mouth in the morning.  8/1/22  Yes Other, MD Desiree   Rhopressa 0.02 % drop INSTILL 1 DROP IN LEFT EYE EVERY DAY IN THE EVENING 7/8/22  Yes Other, MD Desiree timolol (TIMOPTIC) 0.5 % ophthalmic solution INSTILL 1 DROP IN BOTH EYES EVERY MORNING 7/1/22  Yes Tg, MD Desiree   albuterol (PROVENTIL VENTOLIN) 2.5 mg /3 mL (0.083 %) nebu 3 mL. Other, MD Desiree   clopidogrel bisulfate (PLAVIX PO) Plavix    Other, MD Desiree   ofloxacin (FLOXIN) 0.3 % ophthalmic solution INSTILL 1 DROP INTO EYE FOUR TIMES A DAY. START 2 DAYS PRIOR TO SURGERY  Patient not taking: Reported on 8/11/2022 6/29/22   Other, MD Desiree   prednisoLONE acetate (PRED FORTE) 1 % ophthalmic suspension  6/29/22   Other, MD Desiree     Allergies   Allergen Reactions    Penicillins Unknown (comments)      History reviewed. No pertinent family history. Social History:  reports that he has quit smoking. He has never used smokeless tobacco. He reports current alcohol use. He reports that he does not use drugs. Family and social history were personally reviewed, all pertinent and relevant details are outlined as above. Objective:   Visit Vitals  BP (!) 150/74   Pulse 79   Temp 97.5 °F (36.4 °C)   Resp 20   Ht 5' 6\" (1.676 m)   SpO2 96%   BMI 20.74 kg/m²      O2 Device: None (Room air)    PHYSICAL EXAM:   General: Alert x oriented x 3, awake, no acute distress, resting in bed, pleasant male, appears to be stated age  HEENT: PEERL, EOMI, moist mucus membranes  Neck: Supple, no JVD, no meningeal signs  Chest: Clear to auscultation bilaterally   CVS: RRR, S1 S2 heard, no murmurs/rubs/gallops  Abd: Soft, non-tender, non-distended, +bowel sounds   Ext: No clubbing, no cyanosis, no edema  Neuro/Psych: Pleasant mood and affect, CN 2-12 grossly intact, sensory grossly within normal limit, Strength 5/5 in all extremitie  Cap refill: Brisk, less than 3 seconds  Pulses: 2+radial pulses  Skin: Warm, dry, without rashes or lesions    Data Review: All diagnostic labs and studies have been reviewed.     Abnormal Labs Reviewed   CBC WITH AUTOMATED DIFF - Abnormal; Notable for the following components:       Result Value WBC 15.6 (*)     PLATELET 200 (*)     NEUTROPHILS 89 (*)     LYMPHOCYTES 2 (*)     ABS. NEUTROPHILS 13.9 (*)     ABS. LYMPHOCYTES 0.4 (*)     ABS. MONOCYTES 1.2 (*)     ABS. IMM. GRANS. 0.1 (*)     All other components within normal limits   METABOLIC PANEL, COMPREHENSIVE - Abnormal; Notable for the following components:    CO2 20 (*)     Glucose 126 (*)     BUN 39 (*)     Creatinine 2.04 (*)     GFR est AA 37 (*)     GFR est non-AA 31 (*)     Albumin 3.3 (*)     Globulin 4.1 (*)     A-G Ratio 0.8 (*)     All other components within normal limits   URINALYSIS W/ REFLEX CULTURE - Abnormal; Notable for the following components:    Appearance HAZY (*)     Protein 100 (*)     Ketone 40 (*)     Blood MODERATE (*)     Leukocyte Esterase MODERATE (*)     All other components within normal limits   CBC WITH AUTOMATED DIFF - Abnormal; Notable for the following components:    WBC 16.3 (*)     PLATELET 509 (*)     All other components within normal limits   METABOLIC PANEL, BASIC - Abnormal; Notable for the following components:    Glucose 101 (*)     BUN 38 (*)     Creatinine 1.86 (*)     GFR est AA 41 (*)     GFR est non-AA 34 (*)     All other components within normal limits       All Micro Results       Procedure Component Value Units Date/Time    CULTURE, URINE [[id number]] Collected: 08/11/22 2032    Order Status: No result Updated: 08/12/22 0904    CULTURE, BLOOD [[id number]] Collected: 08/12/22 0356    Order Status: Sent Specimen: Blood Updated: 08/12/22 0638    CULTURE, URINE [[id number]] Collected: 08/11/22 2100    Order Status: Canceled Specimen: Urine from Clean catch             IMAGING:   XR CHEST PORT   Final Result      No acute process. CT HEAD WO CONT   Final Result   No acute intracranial abnormality. Age-indeterminate microvascular ischemic   disease in the periventricular white matter. Near-complete opacification of the   right sphenoid sinus. CT ABD PELV WO CONT   Final Result      1.  There is a 2 mm stone at the RIGHT ureterovesicular junction which is causing   mild right-sided hydroureteronephrosis. 2. Status post cholecystectomy. ECG/ECHO:  No results found for this or any previous visit. Assessment:   Given the patient's current clinical presentation, there is a high level of concern for decompensation if discharged from the emergency department. Complex decision making was performed, which includes reviewing the patient's available past medical records, laboratory results, and imaging studies. Active Problems:    Nephrolithiasis (8/11/2022)      YAYA (acute kidney injury) ([facility] [address] (8/11/2022)      Plan:     #obstructive nephrolithiasis  #YAYA  -NPO  -consult urology  -continue ceftriaxone  -IVF    #HTN  -hold antihypertensives, restart as BP allows      DIET: DIET NPO   ISOLATION PRECAUTIONS: There are currently no Active Isolations  CODE STATUS: Full Code   DVT PROPHYLAXIS: SCDs    ANTICIPATED DISCHARGE: 24-48 hours. EMERGENCY CONTACT/SURROGATE DECISION MAKER: daughter        Signed By: Dionna Shah MD     August 12, 2022         Please note that this dictation may have been completed with Rommel Fermin, the computer voice recognition software. Quite often unanticipated grammatical, syntax, homophones, and other interpretive errors are inadvertently transcribed by the computer software. Please disregard these errors. Please excuse any errors that have escaped final proofreading.

## 2022-08-12 NOTE — PROGRESS NOTES
RUR: 13% Low      DANAY: Anticipated discharge home. PT/OT evals pending; awaiting disposition recommendations. Patient's daughter will provide transport home once medically stable. Follow-up with PCP/specialist.     Primary Contact: DaughterAdams, [phone]    Medicare pt has received, reviewed, and signed 1st IM letter informing them of their right to appeal the discharge. Signed copied has been placed on pt bedside chart. *Will need 2nd IM letter prior or discharge. Care Management Interventions  PCP Verified by CM: Yes (Dr. Angela Ruffin)  Mode of Transport at Discharge: Other (see comment) (Daughter)  Transition of Care Consult (CM Consult): Discharge Planning  Discharge Durable Medical Equipment: No  Physical Therapy Consult: Yes  Occupational Therapy Consult: Yes  Speech Therapy Consult: No  Support Systems: Child(memo)  Confirm Follow Up Transport: Family  The Plan for Transition of Care is Related to the Following Treatment Goals : Home  Discharge Location  Patient Expects to be Discharged to[de-identified] Home with family assistance    Reason for Admission:  Nephrolithiasis                    RUR Score:  13% Low                    Plan for utilizing home health:   TBD; PT/OT evals pending       PCP: First and Last name:  None Dr. Angela Ruffin     Name of Practice:    Are you a current patient: Yes/No: Yes   Approximate date of last visit: Within past 6 months    Can you participate in a virtual visit with your PCP: Yes                    Current Advanced Directive/Advance Care Plan: DNR    Healthcare Decision Maker:   Click here to complete [address] including selection of the Healthcare Decision Maker Relationship (ie \"Primary\")             Primary Decision Maker: Casa Godfrey - Daughter - [phone]                  Transition of Care Plan:   Home     CM met with patient and daughterRanjeet at bedside to introduce self and explain role.  Patient lives with his daughter in a 2 story townhouse with 4-5 steps to enter. Patient has 12 steps to enter the 2nd floor; however, has a 1st floor set-up with a master bedroom and all needed amenities. Patient was mostly independent with ADL's with daughter providing assistance as needed. Patient's daughter responsible for IADL's. Patient would mostly \"furniture surf\" for ambulation and when in he community would physically lean on his daughter for support. Patient does own a cane, FWW, WC, shower chair and grab bars within bathroom. CM verified patient's PCP, demographics and insurance. Patient's daughter will provide transport home once medically stable. CM will continue to follow as needed.     ARMEN Jacobson   [phone]

## 2022-08-12 NOTE — ANESTHESIA PREPROCEDURE EVALUATION
Relevant Problems   RENAL FAILURE   (+) YAYA (acute kidney injury) ([facility] [address]   (+) Nephrolithiasis       Anesthetic History   No history of anesthetic complications            Review of Systems / Medical History  Patient summary reviewed, nursing notes reviewed and pertinent labs reviewed    Pulmonary  Within defined limits                 Neuro/Psych   Within defined limits           Cardiovascular  Within defined limits  Hypertension          CAD         GI/Hepatic/Renal  Within defined limits              Endo/Other  Within defined limits           Other Findings              Physical Exam    Airway  Mallampati: II  TM Distance: > 6 cm  Neck ROM: normal range of motion   Mouth opening: Normal     Cardiovascular  Regular rate and rhythm,  S1 and S2 normal,  no murmur, click, rub, or gallop             Dental  No notable dental hx       Pulmonary  Breath sounds clear to auscultation               Abdominal  GI exam deferred       Other Findings            Anesthetic Plan    ASA: 3  Anesthesia type: general          Induction: Intravenous  Anesthetic plan and risks discussed with: Patient

## 2022-08-12 NOTE — PROGRESS NOTES
Pt confused and agitated, according to daughter, pt has intermittent confusion at home, however, she states that his current state is worse than normal. Dr. Michelle Guallpaw at bedside to peak with daughter about concerns. No new orders at this time, will continue to monitor.

## 2022-08-12 NOTE — PROGRESS NOTES
TRANSFER - OUT REPORT:    Verbal report given to [facility] CHELSEA JOSÉ RN(name) on Karina Varela  being transferred to 5E(unit) for routine post - op       Report consisted of patients Situation, Background, Assessment and   Recommendations(SBAR). Time Pre op antibiotic given:scheduled  Anesthesia Stop time: 9858   [place]man Present on Transfer to floor:no  Order for Eastman on Chart:no  Discharge Prescriptions with Chart:n/a    Information from the following report(s) SBAR, Procedure Summary, Intake/Output, MAR, and Recent Results was reviewed with the receiving nurse. Opportunity for questions and clarification was provided. Is the patient on 02? NO       L/Min       Other     Is the patient on a monitor? NO    Is the nurse transporting with the patient? NO    Surgical Waiting Area notified of patient's transfer from PACU? YES      The following personal items collected during your admission accompanied patient upon transfer:   Dental Appliance: Dental Appliances: None  Vision:    Hearing Aid:    Jewelry: Jewelry: None  Clothing: Clothing: None  Other Valuables:  Other Valuables: Eyeglasses  Valuables sent to safe: Personal Items Sent to Safe: none

## 2022-08-13 LAB
ANION GAP SERPL CALC-SCNC: 12 MMOL/L (ref 5–15)
BASOPHILS # BLD: 0 K/UL (ref 0–0.1)
BASOPHILS NFR BLD: 0 % (ref 0–1)
BUN SERPL-MCNC: 42 MG/DL (ref 6–20)
BUN/CREAT SERPL: 29 (ref 12–20)
CALCIUM SERPL-MCNC: 8.7 MG/DL (ref 8.5–10.1)
CHLORIDE SERPL-SCNC: 110 MMOL/L (ref 97–108)
CO2 SERPL-SCNC: 21 MMOL/L (ref 21–32)
CREAT SERPL-MCNC: 1.46 MG/DL (ref 0.7–1.3)
DIFFERENTIAL METHOD BLD: ABNORMAL
EOSINOPHIL # BLD: 0 K/UL (ref 0–0.4)
EOSINOPHIL NFR BLD: 0 % (ref 0–7)
ERYTHROCYTE [DISTWIDTH] IN BLOOD BY AUTOMATED COUNT: 12.7 % (ref 11.5–14.5)
GLUCOSE SERPL-MCNC: 83 MG/DL (ref 65–100)
HCT VFR BLD AUTO: 44.8 % (ref 36.6–50.3)
HGB BLD-MCNC: 15.1 G/DL (ref 12.1–17)
IMM GRANULOCYTES # BLD AUTO: 0.1 K/UL (ref 0–0.04)
IMM GRANULOCYTES NFR BLD AUTO: 1 % (ref 0–0.5)
LYMPHOCYTES # BLD: 0.4 K/UL (ref 0.8–3.5)
LYMPHOCYTES NFR BLD: 3 % (ref 12–49)
MCH RBC QN AUTO: 32.8 PG (ref 26–34)
MCHC RBC AUTO-ENTMCNC: 33.7 G/DL (ref 30–36.5)
MCV RBC AUTO: 97.4 FL (ref 80–99)
MONOCYTES # BLD: 0.8 K/UL (ref 0–1)
MONOCYTES NFR BLD: 6 % (ref 5–13)
NEUTS SEG # BLD: 11.6 K/UL (ref 1.8–8)
NEUTS SEG NFR BLD: 90 % (ref 32–75)
NRBC # BLD: 0 K/UL (ref 0–0.01)
NRBC BLD-RTO: 0 PER 100 WBC
PLATELET # BLD AUTO: 126 K/UL (ref 150–400)
PMV BLD AUTO: 11.7 FL (ref 8.9–12.9)
POTASSIUM SERPL-SCNC: 4.7 MMOL/L (ref 3.5–5.1)
RBC # BLD AUTO: 4.6 M/UL (ref 4.1–5.7)
RBC MORPH BLD: ABNORMAL
SODIUM SERPL-SCNC: 143 MMOL/L (ref 136–145)
WBC # BLD AUTO: 12.9 K/UL (ref 4.1–11.1)

## 2022-08-13 PROCEDURE — 36415 COLL VENOUS BLD VENIPUNCTURE: CPT

## 2022-08-13 PROCEDURE — 97530 THERAPEUTIC ACTIVITIES: CPT

## 2022-08-13 PROCEDURE — 74011250636 HC RX REV CODE- 250/636: Performed by: STUDENT IN AN ORGANIZED HEALTH CARE EDUCATION/TRAINING PROGRAM

## 2022-08-13 PROCEDURE — 74011000250 HC RX REV CODE- 250: Performed by: STUDENT IN AN ORGANIZED HEALTH CARE EDUCATION/TRAINING PROGRAM

## 2022-08-13 PROCEDURE — 97535 SELF CARE MNGMENT TRAINING: CPT

## 2022-08-13 PROCEDURE — 97165 OT EVAL LOW COMPLEX 30 MIN: CPT

## 2022-08-13 PROCEDURE — 74011250637 HC RX REV CODE- 250/637: Performed by: STUDENT IN AN ORGANIZED HEALTH CARE EDUCATION/TRAINING PROGRAM

## 2022-08-13 PROCEDURE — 85025 COMPLETE CBC W/AUTO DIFF WBC: CPT

## 2022-08-13 PROCEDURE — 80048 BASIC METABOLIC PNL TOTAL CA: CPT

## 2022-08-13 PROCEDURE — 97161 PT EVAL LOW COMPLEX 20 MIN: CPT

## 2022-08-13 PROCEDURE — 74011250637 HC RX REV CODE- 250/637: Performed by: NURSE PRACTITIONER

## 2022-08-13 PROCEDURE — 65270000029 HC RM PRIVATE

## 2022-08-13 RX ADMIN — HEPARIN SODIUM 5000 UNITS: 5000 INJECTION INTRAVENOUS; SUBCUTANEOUS at 21:53

## 2022-08-13 RX ADMIN — METOPROLOL TARTRATE 12.5 MG: 25 TABLET, FILM COATED ORAL at 09:08

## 2022-08-13 RX ADMIN — SODIUM CHLORIDE, PRESERVATIVE FREE 2 G: 5 INJECTION INTRAVENOUS at 16:48

## 2022-08-13 RX ADMIN — ATORVASTATIN CALCIUM 40 MG: 40 TABLET, FILM COATED ORAL at 09:08

## 2022-08-13 RX ADMIN — DORZOLAMIDE HYDROCHLORIDE 1 DROP: 20 SOLUTION/ DROPS OPHTHALMIC at 18:00

## 2022-08-13 RX ADMIN — HEPARIN SODIUM 5000 UNITS: 5000 INJECTION INTRAVENOUS; SUBCUTANEOUS at 06:48

## 2022-08-13 RX ADMIN — TIMOLOL MALEATE 1 DROP: 5 SOLUTION OPHTHALMIC at 10:35

## 2022-08-13 RX ADMIN — TAMSULOSIN HYDROCHLORIDE 0.4 MG: 0.4 CAPSULE ORAL at 09:08

## 2022-08-13 RX ADMIN — HEPARIN SODIUM 5000 UNITS: 5000 INJECTION INTRAVENOUS; SUBCUTANEOUS at 14:00

## 2022-08-13 RX ADMIN — SODIUM CHLORIDE 75 ML/HR: 9 INJECTION, SOLUTION INTRAVENOUS at 21:55

## 2022-08-13 RX ADMIN — DORZOLAMIDE HYDROCHLORIDE 1 DROP: 20 SOLUTION/ DROPS OPHTHALMIC at 10:35

## 2022-08-13 NOTE — OP NOTES
[facility]  OPERATIVE REPORT    Name:  Jose Rosario  MR#:  [id number]  :  1930  ACCOUNT #:  [de-identified]  DATE OF SERVICE:  2022    PREOPERATIVE DIAGNOSIS:  Right ureteral stone. POSTOPERATIVE DIAGNOSIS:  Right ureteral stone, bladder stones, balanitis. PROCEDURE PERFORMED:  Cystoscopy, right ureteral stent insertion. SURGEON:  Ji Angel MD    ASSISTANT:  None. ANESTHESIA:  General.    COMPLICATIONS:  None. SPECIMENS REMOVED:  None. IMPLANTS:  Right 6-Bahraini x 24-cm double-J ureteral stent with short string in the bladder. ESTIMATED BLOOD LOSS:  None. INDICATIONS FOR PROCEDURE:  The patient is a 80-year-old male, who was admitted with nausea, vomiting, abdominal pain and fatigue. He had a CT showing a 2-mm right UVJ stone with mild right hydronephrosis. He also had an elevated creatinine and white blood cell count. His white blood cell count continued to increase and therefore he was offered ureteral stent placement due to concerns for impending sepsis. Risks and benefits were discussed with him as well as his daughter, and they elected to proceed. PROCEDURE AND FINDINGS:  After informed consent was obtained and preoperative antibiotics were given, he was taken to the operating room where general anesthesia was induced without complications. He was placed in modified dorsal lithotomy position and his genitals were prepped and draped in standard sterile fashion. A standard surgical time-out was performed. Inspection of the penis demonstrated diffuse erythema of the gland consistent with balanitis. A rigid cystoscope was introduced through the urethra into the bladder, and cystoscopy was performed demonstrating numerous tiny bladder stones measuring approximately 2 mm in diameter that were flushed from the bladder with the scope. Both ureteral orifices were identified in orthotopic position.   There was edema of the right ureteral orifice consistent with a right UVJ stone. The remainder of the bladder was normal.  A wire was advanced up the right ureter under fluoroscopic guidance. A stent was then advanced with mild resistance distally consistent with an impacted stone. The 6-Polish x 24-cm double-J ureteral stent was advanced over the wire up to the renal pelvis. The wire was removed leaving curls in the right renal pelvis and bladder. A short string was left on the end of the stent. The bladder was emptied, and the scope was removed. He was returned to the normal supine position, awakened and taken to the recovery unit in stable condition. There were no apparent complications, and he tolerated the procedure well. He would be prescribed Mycolog for the balanitis.       Jacoby Jameson MD      TB/[email or web address]/  D:  08/12/2022 16:32  T:  08/12/2022 22:15  JOB #:  [id number]

## 2022-08-13 NOTE — PROGRESS NOTES
Progress Note    Patient: Shanell Calixto MRN: [id number]  SSN: [id number]    YOB: 1930 Age: 80 y.o. Sex: male   Height: Height: 5' 6\" (167.6 cm) Weight: Weight: 57.2 kg (126 lb) BMI: Body mass index is 20.34 kg/m². Emergency  Contact:  Primary Emergency Contact: Yesy Armijo, Home Phone: [phone]   PCP:   None None None     Hospital Day: 3 - Admitted 2022  3:17 PM by Nelson Ann MD - DNR  C/Lurdes James 1106 Problems    Diagnosis Date Noted    Nephrolithiasis 2022    YAYA (acute kidney injury) ([facility] [address] 2022    1 Day Post-Op Procedure(s):  CYSTOSCOPY, RIGHT RETROGRADE PYELOGRAM, RIGHT URETERAL STENT PLACEMENT  Surgeon(s):  Jamaica Macias MD         Assessment/Plan:       81 yo M with Right 2 mm UVJ calculus, right hydro. Notable leukocytosis, YAYA.  Abnormal UA  WBC POD sp R stent  Cr, WBC improved  -Tailor abx to culture when results, 10-14 day course for complicated uti  -fu as outpt for definitive stone treatment after sterilization of urine  -please send home with meds for stent colic upon discharge, when deemed appropriate per primary team     Subjective: No acute events, no stent complaints   Imaging:    Exam: Nad  Nonlabored breathing   ROS:  [] SOB (Respiratory)  [] Flatulance (GI)         Labs: Recent Labs     22  0338 22  0354 22  1534   WBC 12.9* 16.3* 15.6*   HGB 15.1 16.5 15.0   HCT 44.8 49.6 46.0   * 136* 134*     Recent Labs     22  0338 22  0354 22  1534    138 136   K 4.7 4.5 4.6   * 105 101   CO2 21 21 20*   GLU 83 101* 126*   BUN 42* 38* 39*   CREA 1.46* 1.86* 2.04*   CA 8.7 8.7 8.6      ID: Temp (24hrs), Av.2 °F (36.8 °C), Min:97.6 °F (36.4 °C), Max:98.4 °F (36.9 °C)    2022: WBC 15.6 K/uL (H; Ref range: 4.1 - 11.1 K/uL)  2022: WBC 16.3 K/uL (H; Ref range: 4.1 - 11.1 K/uL)  2022: WBC 12.9 K/uL (H; Ref range: 4.1 - 11.1 K/uL)  Current Antimicrobial Therapy (168h ago, onward)       Ordered     Start Stop    08/12/22 0748  cefTRIAXone (ROCEPHIN) 1 g in 0.9% sodium chloride 10 mL IV syringe  1 g,   IntraVENous,   EVERY 24 HOURS        References:    Bennyicomp    08/12/22 2100 08/18/22 2059                  Cultures: All Micro Results       Procedure Component Value Units Date/Time    CULTURE, URINE [[id number]]  (Abnormal) Collected: 08/11/22 2032    Order Status: Completed Specimen: Urine Updated: 08/13/22 0912     Special Requests: --        NO SPECIAL REQUESTS  Reflexed from [id number]       Lyman Count --        [id number]  COLONIES/mL       Culture result:       MIXED UROGENITAL BRANDYN ISOLATED            APPARENT  PSEUDOMONAS SPECIES (3,000 COL/mL)    CULTURE, BLOOD [[id number]] Collected: 08/12/22 0356    Order Status: Completed Specimen: Blood Updated: 08/13/22 0520     Special Requests: NO SPECIAL REQUESTS        Culture result: NO GROWTH AFTER 22 HOURS       CULTURE, URINE [[id number]] Collected: 08/11/22 2100    Order Status: Canceled Specimen: Urine from Clean catch            GI: Intake: ADULT DIET Regular              Abdominal Assessment: Intact  Bowel Sounds: Active    No data found.         Pain: 0/10 Constant, Sore - Abdomen -        Current Analgesic Therapy (168h ago, onward)      None           :    Voiding;Frequency -      8/11/2022: Creatinine 2.04 MG/DL (H; Ref range: 0.70 - 1.30 MG/DL)  8/12/2022: Creatinine 1.86 MG/DL (H; Ref range: 0.70 - 1.30 MG/DL)  8/13/2022: Creatinine 1.46 MG/DL (H; Ref range: 0.70 - 1.30 MG/DL)       Vitals: O2 Device: None (Room air) @ O2 Flow Rate (L/min): 3 l/min  Patient Vitals for the past 24 hrs:   BP Temp Pulse Resp SpO2 Height Weight   08/13/22 0835 136/78 98.3 °F (36.8 °C) 74 18 97 % -- --   08/13/22 0212 -- -- 63 -- -- -- --   08/12/22 2227 -- -- 71 -- -- -- --   08/12/22 2012 -- -- 78 -- -- -- --   08/12/22 1726 117/66 -- 78 19 100 % -- --   08/12/22 1711 (!) 123/51 -- 77 22 100 % -- --   08/12/22 1701 -- 97.6 °F (36.4 °C) -- -- -- -- --   08/12/22 1656 (!) 111/58 -- 76 16 100 % -- --   08/12/22 1646 (!) 111/49 -- 75 13 100 % -- --   08/12/22 1642 107/83 -- 73 16 100 % -- --   08/12/22 1637 92/60 -- 79 14 100 % -- --   08/12/22 1635 92/63 98.4 °F (36.9 °C) 77 14 100 % -- --   08/12/22 1535 (!) 146/53 98.1 °F (36.7 °C) 86 20 93 % 5' 6\" (1.676 m) 57.2 kg (126 lb)   08/12/22 1457 139/76 98.4 °F (36.9 °C) 83 22 94 % -- --   08/12/22 1455 -- -- -- -- -- -- 57.2 kg (126 lb 1.7 oz)   08/12/22 1400 -- -- 77 -- -- -- --   08/12/22 1200 -- -- 77 -- -- -- --   08/12/22 1151 (!) 135/92 -- 95 -- -- -- --      I&O's:    Date 08/12/22 0700 - 08/13/22 0659 08/13/22 0700 - 08/14/22 0659   Shift 1693-5792 8691-5438 24 Hour Total 8095-8022 5571-5452 24 Hour Total   INTAKE   I.V.(mL/kg/hr) 500(0.7)  500(0.4)        Volume (lactated Ringers infusion) 500  500      Shift Total(mL/kg) 500(8.7)  500(8.7)      OUTPUT   Shift Total(mL/kg)           500      Weight (kg) 57.2 57.2 57.2 57.2 57.2 57.2       Meds:    Current Facility-Administered Medications:     sodium chloride (NS) flush 5-10 mL, 5-10 mL, IntraVENous, PRN, Ferny Carpenter MD    cefTRIAXone (ROCEPHIN) 1 g in 0.9% sodium chloride 10 mL IV syringe, 1 g, IntraVENous, Q24H, Susanna Kee MD, 1 g at 08/12/22 2222    tamsulosin (FLOMAX) capsule 0.4 mg, 0.4 mg, Oral, DAILY, Ori Houston, NP, 0.4 mg at 08/13/22 0908    0.9% sodium chloride infusion, 75 mL/hr, IntraVENous, CONTINUOUS, Mildred Oquendo MD, Last Rate: 75 mL/hr at 08/12/22 1504, 75 mL/hr at 08/12/22 1504    albuterol (PROVENTIL VENTOLIN) nebulizer solution 2.5 mg, 2.5 mg, Nebulization, Q4H PRN, Mildred Oquendo MD    atorvastatin (LIPITOR) tablet 40 mg, 40 mg, Oral, DAILY, Mildred Oquendo MD, 40 mg at 08/13/22 0908    dorzolamide (TRUSOPT) 2 % ophthalmic solution 1 Drop, 1 Drop, Both Eyes, BID, Susanna Bo MD, 1 Drop at 08/13/22 1035    latanoprost (XALATAN) 0.005 % ophthalmic solution 1 Drop, 1 Drop, Right Eye, QHS, Luiz Garcia MD    metoprolol tartrate (LOPRESSOR) tablet 12.5 mg, 12.5 mg, Oral, DAILY, Luiz Garcia MD, 12.5 mg at 08/13/22 0908    . PHARMACY TO SUBSTITUTE PER PROTOCOL (Reordered from: Rhopressa 0.02 % drop), , , Per Protocol, Luiz Garcia MD    timolol (TIMOPTIC) 0.5 % ophthalmic solution 1 Drop, 1 Drop, Both Eyes, DAILY, Luiz Garcia MD, 1 Drop at 08/13/22 1035    . PHARMACY TO SUBSTITUTE PER PROTOCOL (Reordered from: Vyzulta 0.024 % drop), , , Per Protocol, Luiz Garcia MD    heparin (porcine) injection 5,000 Units, 5,000 Units, SubCUTAneous, Q8H, Luiz Garcia MD, 5,000 Units at 08/13/22 4819          Signed By: Yana Gary MD - August 13, 2022

## 2022-08-13 NOTE — PROGRESS NOTES
Renal Dosing/Monitoring  Medication: Cefepime   Current regimen: New start for UTI  Recent Labs     08/13/22  0338 08/12/22  0354 08/11/22  1534   CREA 1.46* 1.86* 2.04*   BUN 42* 38* 39*     Estimated CrCl:  26 ml/min  Plan:   Dose adjusted based on extended infusion beta lactam protocol to:  Cefepime 2 gm LD over 3 minutes, followed by  1 gm IV q24h over 4 hours for crcl 11-29.

## 2022-08-13 NOTE — PROGRESS NOTES
[facility] Adult  Hospitalist Group                                                                                          Hospitalist Progress Note  Ann Waldron MD  Answering service: [phone] from in house phone        Date of Service:  2022  NAME:  Slim Loyola  :  1930  MRN:  [id number]      Admission Summary:   HPI: \"Osmar Mendenhall is a 80 y.o. male with a pmhx htn, CAD with past MI, glaucoma, and blindness who presents with nausea and vomiting, and abdominal pain, and was transferred from [facility] for admission for obstructive nephrolithiasis with YAYA. History obtained from chart review due to patient only oriented to self at this time. In the ED, VSS. Labs showed WBC 15.6, creatinine 2.03, (b/l 1.2-1.3), UA with moderate blood, mod LE 50-10 WBC, and negative for bacteria. CT abdomen/pelvis with 2mm stone in the right ureterovesicular junction with mild right hydroureteronephrosis. \"        Interval history / Subjective:   Patient seen examined at bedside earlier. Still mildly confused. Daughter present at bedside.   Status post ureteral stent placement yesterday     Assessment & Plan:     Acute metabolic encephalopathy  Pyelonephritis with nephrolithiasis  YAYA  - CT abdomen pelvis confirming a 2 mm UVJ calculus with evidence of right hydro  -Appreciate urology recommendations, s/p R ureteral stent placement   -Continue IV Rocephin  - Follow cultures, will tailor antibiotic therapy based on cultures  -May consider Pyridium for stent colic once YAYA resolved  -Continue IV fluid  - I's and O's  -tsh wnl  B12 folate pending,CT head negative  PT OT             Code status: dnr   Prophylaxis: Heparin subcu  Care Plan discussed with: Patient/family, nurse, case management  Anticipated Disposition: 24-48 hours     Hospital Problems  Never Reviewed            Codes Class Noted POA    Nephrolithiasis ICD-10-CM: N20.0  ICD-9-CM: 592.0  2022 Unknown YAYA (acute kidney injury) [facility]) ICD-10-CM: N17.9  ICD-9-CM: 584.9  8/11/2022 Unknown           Review of Systems:   A comprehensive review of systems was negative except for that written in the HPI. Vital Signs:    Last 24hrs VS reviewed since prior progress note. Most recent are:  Visit Vitals  /78   Pulse 74   Temp 98.3 °F (36.8 °C)   Resp 18   Ht 5' 6\" (1.676 m)   Wt 57.2 kg (126 lb)   SpO2 97%   BMI 20.34 kg/m²         Intake/Output Summary (Last 24 hours) at 8/13/2022 1329  Last data filed at 8/12/2022 1629  Gross per 24 hour   Intake 500 ml   Output --   Net 500 ml          Physical Examination:     I had a face to face encounter with this patient and independently examined them on 8/13/2022 as outlined below:          Constitutional:  No acute distress, cooperative, pleasant    ENT:  Oral mucosa moist, oropharynx benign. Resp:  CTA bilaterally. No wheezing/rhonchi/rales. No accessory muscle use. CV:  Regular rhythm, normal rate, no murmurs, gallops, rubs    GI:  Soft, non distended, non tender. normoactive bowel sounds, no hepatosplenomegaly     Musculoskeletal:  No edema, warm, 2+ pulses throughout    Neurologic:  Moves all extremities. AAOx1, CN II-XII reviewed            Data Review:    Review and/or order of clinical lab test  Review and/or order of tests in the radiology section of CPT  Review and/or order of tests in the medicine section of CPT      Labs:     Recent Labs     08/13/22  0338 08/12/22  0354   WBC 12.9* 16.3*   HGB 15.1 16.5   HCT 44.8 49.6   * 136*       Recent Labs     08/13/22  0338 08/12/22  0354 08/11/22  1534    138 136   K 4.7 4.5 4.6   * 105 101   CO2 21 21 20*   BUN 42* 38* 39*   CREA 1.46* 1.86* 2.04*   GLU 83 101* 126*   CA 8.7 8.7 8.6       Recent Labs     08/11/22  1534   ALT 25      TBILI 0.8   TP 7.4   ALB 3.3*   GLOB 4.1*       No results for input(s): INR, PTP, APTT, INREXT, INREXT in the last 72 hours.    No results for input(s): FE, TIBC, PSAT, FERR in the last 72 hours. No results found for: FOL, RBCF   No results for input(s): PH, PCO2, PO2 in the last 72 hours. No results for input(s): CPK, CKNDX, TROIQ in the last 72 hours. No lab exists for component: CPKMB  No results found for: CHOL, CHOLX, CHLST, CHOLV, HDL, HDLP, LDL, LDLC, DLDLP, TGLX, TRIGL, TRIGP, CHHD, CHHDX  No results found for: [facility]  Lab Results   Component Value Date/Time    Color YELLOW/STRAW 08/11/2022 08:32 PM    Appearance HAZY (A) 08/11/2022 08:32 PM    Specific gravity 1.023 08/11/2022 08:32 PM    pH (UA) 5.5 08/11/2022 08:32 PM    Protein 100 (A) 08/11/2022 08:32 PM    Glucose Negative 08/11/2022 08:32 PM    Ketone 40 (A) 08/11/2022 08:32 PM    Urobilinogen 0.2 08/11/2022 08:32 PM    Nitrites Negative 08/11/2022 08:32 PM    Leukocyte Esterase MODERATE (A) 08/11/2022 08:32 PM    Epithelial cells FEW 08/11/2022 08:32 PM    Bacteria Negative 08/11/2022 08:32 PM    WBC  08/11/2022 08:32 PM    RBC 5-10 08/11/2022 08:32 PM         Medications Reviewed:     Current Facility-Administered Medications   Medication Dose Route Frequency    sodium chloride (NS) flush 5-10 mL  5-10 mL IntraVENous PRN    cefTRIAXone (ROCEPHIN) 1 g in 0.9% sodium chloride 10 mL IV syringe  1 g IntraVENous Q24H    tamsulosin (FLOMAX) capsule 0.4 mg  0.4 mg Oral DAILY    0.9% sodium chloride infusion  75 mL/hr IntraVENous CONTINUOUS    albuterol (PROVENTIL VENTOLIN) nebulizer solution 2.5 mg  2.5 mg Nebulization Q4H PRN    atorvastatin (LIPITOR) tablet 40 mg  40 mg Oral DAILY    dorzolamide (TRUSOPT) 2 % ophthalmic solution 1 Drop  1 Drop Both Eyes BID    latanoprost (XALATAN) 0.005 % ophthalmic solution 1 Drop  1 Drop Right Eye QHS    metoprolol tartrate (LOPRESSOR) tablet 12.5 mg  12.5 mg Oral DAILY    . PHARMACY TO SUBSTITUTE PER PROTOCOL (Reordered from: Rhopressa 0.02 % drop)    Per Protocol    timolol (TIMOPTIC) 0.5 % ophthalmic solution 1 Drop  1 Drop Both Eyes DAILY    . PHARMACY TO SUBSTITUTE PER PROTOCOL (Reordered from: Vyzulta 0.024 % drop)    Per Protocol    heparin (porcine) injection 5,000 Units  5,000 Units SubCUTAneous Q8H     ______________________________________________________________________  EXPECTED LENGTH OF STAY: 3d 16h  ACTUAL LENGTH OF STAY:          2                 Nurys Mercado MD

## 2022-08-13 NOTE — PROGRESS NOTES
Problem: Mobility Impaired (Adult and Pediatric)  Goal: *Acute Goals and Plan of Care (Insert Text)  Description: FUNCTIONAL STATUS PRIOR TO ADMISSION: Per daughter, pt indep with functional mobility and ADLs at baseline. Lives with daughter who provides supervision due to pt with h/o intermittent confusion. Daughter works from home. HOME SUPPORT PRIOR TO ADMISSION: The patient lived with daughter; two local granddaughters, one of whom is RN. Physical Therapy Goals  Initiated 8/13/2022  1. Patient will move from supine to sit and sit to supine  and roll side to side in bed with supervision/set-up within 7 day(s). 2.  Patient will transfer from bed to chair and chair to bed with contact guard assist using the least restrictive device within 7 day(s). 3.  Patient will perform sit to stand with contact guard assist within 7 day(s). 4.  Patient will ambulate with contact guard assist for 150 feet with the least restrictive device within 7 day(s). 5.  Patient will ascend/descend 4 stairs with bilat handrail(s) with minimal assistance/contact guard assist within 7 day(s). Outcome: Not Met   PHYSICAL THERAPY EVALUATION  Patient: Joe Guajardo (80 y.o. male)  Date: 8/13/2022  Primary Diagnosis: YAYA (acute kidney injury) ([facility] [address] [N17.9]  Nephrolithiasis [N20.0]  Procedure(s) (LRB):  CYSTOSCOPY, RIGHT RETROGRADE PYELOGRAM, RIGHT URETERAL STENT PLACEMENT (Right) 1 Day Post-Op   Precautions: fall       ASSESSMENT  Based on the objective data described below, the patient presents with confusion, delayed processing of verbal instruction, impaired balance, decreased indep with functional mobility following cysto/ R ureteral stent placement. Pt received with supportive daughter at bedside. Remains confused, but followed simple commands with increased time and repeated instruction. Noted pt perseveration on keeping urinal in place due to urinary urgency with inconsistent results.  Educated pt on risk of skin breakdown; placed male incontinence wrap/ pad at end of session with urinal within pt reach. Pt tolerated brief EOB activity with assist for balance, stand pivot to chair with 2 person assist, sit to stand from chair height with one person assist. Noted wide GRANT and decreased standing balance with pivot to chair. Pt educated on need for assist with all mobility at this time to prevent fall; alarm pad placed and activated. Pt remains below functional baseline with ongoing confusion. Will benefit from con't PT for balance/ mobility training to enable increased safety and indep. Anticipate steady progress toward goals with pt able to return home with 24/7 assist from daughter and [facility] PT.    Current Level of Function Impacting Discharge (mobility/balance): bed mob min A, transfer min A x 2    Other factors to consider for discharge: excellent family support, indep with mobility at baseline     Patient will benefit from skilled therapy intervention to address the above noted impairments. PLAN :  Recommendations and Planned Interventions: bed mobility training, transfer training, gait training, therapeutic exercises, patient and family training/education, and therapeutic activities      Frequency/Duration: Patient will be followed by physical therapy:  5 times a week to address goals.     Recommendation for discharge: (in order for the patient to meet his/her long term goals)  Physical therapy at least 2 days/week in the home AND ensure assist and/or supervision for safety with functional mobility    This discharge recommendation:  Has not yet been discussed the attending provider and/or case management    IF patient discharges home will need the following DME: to be determined (TBD)         SUBJECTIVE:   Patient stated I need more time to sort it out re verbal instruction    OBJECTIVE DATA SUMMARY:   HISTORY:    Past Medical History:   Diagnosis Date    Blindness     Glaucoma     HTN (hypertension)     MI (myocardial infarction) [facility])      Past Surgical History:   Procedure Laterality Date    HX MITRAL VALVE REPLACEMENT         Personal factors and/or comorbidities impacting plan of care: glaucoma with decreased vision R eye, memory impairment    Home Situation  Home Environment: Private residence  # Steps to Enter: 4  Rails to Enter: Yes  One/Two Story Residence: Two story, live on 1st floor  Living Alone: No  Support Systems: Child(memo) (lives with daughter who works from home)  Patient Expects to be Discharged to[de-identified] Home with family assistance  Current DME Used/Available at Home: Shower chair  Tub or Shower Type: Shower    EXAMINATION/PRESENTATION/DECISION MAKING:   Critical Behavior:  Neurologic State: Alert, Eyes open to voice  Orientation Level: Oriented to person, Disoriented to place, Disoriented to situation  Cognition: Appropriate for age attention/concentration, Follows commands       Skin:  compromised skin integrity distal penis; physician aware    Range Of Motion:  AROM: Generally decreased, functional          Strength:    Strength: Generally decreased, functional                    Tone & Sensation:   Tone: Normal              Sensation: Intact               Coordination:  Coordination: Generally decreased, functional  Vision:      Functional Mobility:  Bed Mobility:     Supine to Sit: Minimum assistance; Additional time;Bed Modified     Scooting: Contact guard assistance;Minimum assistance (scoot to EOB)  Transfers:  Sit to Stand: Contact guard assistance;Assist x2  Stand to Sit: Contact guard assistance;Assist x2        Bed to Chair: Minimum assistance;Assist x2 (SPT with HHA)              Balance:   Sitting: Impaired  Sitting - Static: Good (unsupported)  Sitting - Dynamic: Fair (occasional)  Standing: Impaired; With support (HHA)  Standing - Static: Fair;Constant support  Standing - Dynamic : Fair;Constant support       Physical Therapy Evaluation Charge Determination   History Examination Presentation Decision-Making   MEDIUM  Complexity : 1-2 comorbidities / personal factors will impact the outcome/ POC  LOW Complexity : 1-2 Standardized tests and measures addressing body structure, function, activity limitation and / or participation in recreation  LOW Complexity : Stable, uncomplicated  LOW Complexity : FOTO score of       Based on the above components, the patient evaluation is determined to be of the following complexity level: LOW     Pain Rating:  Pt notes discomfort related to difficulty urinating    Activity Tolerance:   Fair and requires rest breaks    After treatment patient left in no apparent distress:   Sitting in chair, Call bell within reach, Bed / chair alarm activated, and Caregiver / family present    COMMUNICATION/EDUCATION:   The patients plan of care was discussed with: Registered nurse. Fall prevention education was provided and the patient/caregiver indicated understanding., Patient/family have participated as able in goal setting and plan of care. , and Patient/family agree to work toward stated goals and plan of care.     Thank you for this referral.  Negro Fam, PT   Time Calculation: 45 mins

## 2022-08-13 NOTE — PROGRESS NOTES
End of Shift Note    Bedside shift change report given to RN (oncoming nurse) by Maria Eugenia Carrasco RN (offgoing nurse). Report included the following information SBAR, Intake/Output, MAR, and Recent Results    Shift worked:  4816-4321     Shift summary and any significant changes:     Uneventful shift. Pt voiding. Activity tolerated well.       Concerns for physician to address:  None     Zone phone for oncoming shift:   N/a         Maria Eugenia Carrasco RN

## 2022-08-14 LAB
ANION GAP SERPL CALC-SCNC: 8 MMOL/L (ref 5–15)
BASOPHILS # BLD: 0 K/UL (ref 0–0.1)
BASOPHILS NFR BLD: 0 % (ref 0–1)
BUN SERPL-MCNC: 35 MG/DL (ref 6–20)
BUN/CREAT SERPL: 32 (ref 12–20)
CALCIUM SERPL-MCNC: 8.3 MG/DL (ref 8.5–10.1)
CHLORIDE SERPL-SCNC: 112 MMOL/L (ref 97–108)
CO2 SERPL-SCNC: 22 MMOL/L (ref 21–32)
CREAT SERPL-MCNC: 1.08 MG/DL (ref 0.7–1.3)
DIFFERENTIAL METHOD BLD: ABNORMAL
EOSINOPHIL # BLD: 0 K/UL (ref 0–0.4)
EOSINOPHIL NFR BLD: 0 % (ref 0–7)
ERYTHROCYTE [DISTWIDTH] IN BLOOD BY AUTOMATED COUNT: 13 % (ref 11.5–14.5)
FOLATE SERPL-MCNC: 28.3 NG/ML (ref 5–21)
GLUCOSE SERPL-MCNC: 85 MG/DL (ref 65–100)
HCT VFR BLD AUTO: 43.5 % (ref 36.6–50.3)
HGB BLD-MCNC: 14 G/DL (ref 12.1–17)
IMM GRANULOCYTES # BLD AUTO: 0.1 K/UL (ref 0–0.04)
IMM GRANULOCYTES NFR BLD AUTO: 1 % (ref 0–0.5)
LYMPHOCYTES # BLD: 0.6 K/UL (ref 0.8–3.5)
LYMPHOCYTES NFR BLD: 6 % (ref 12–49)
MCH RBC QN AUTO: 32.8 PG (ref 26–34)
MCHC RBC AUTO-ENTMCNC: 32.2 G/DL (ref 30–36.5)
MCV RBC AUTO: 101.9 FL (ref 80–99)
MONOCYTES # BLD: 0.7 K/UL (ref 0–1)
MONOCYTES NFR BLD: 7 % (ref 5–13)
NEUTS SEG # BLD: 9 K/UL (ref 1.8–8)
NEUTS SEG NFR BLD: 86 % (ref 32–75)
NRBC # BLD: 0 K/UL (ref 0–0.01)
NRBC BLD-RTO: 0 PER 100 WBC
PLATELET # BLD AUTO: 131 K/UL (ref 150–400)
PMV BLD AUTO: 11.1 FL (ref 8.9–12.9)
POTASSIUM SERPL-SCNC: 3.8 MMOL/L (ref 3.5–5.1)
RBC # BLD AUTO: 4.27 M/UL (ref 4.1–5.7)
RBC MORPH BLD: ABNORMAL
SODIUM SERPL-SCNC: 142 MMOL/L (ref 136–145)
VIT B12 SERPL-MCNC: 1477 PG/ML (ref 193–986)
WBC # BLD AUTO: 10.4 K/UL (ref 4.1–11.1)

## 2022-08-14 PROCEDURE — 74011250637 HC RX REV CODE- 250/637: Performed by: STUDENT IN AN ORGANIZED HEALTH CARE EDUCATION/TRAINING PROGRAM

## 2022-08-14 PROCEDURE — 82746 ASSAY OF FOLIC ACID SERUM: CPT

## 2022-08-14 PROCEDURE — 74011000258 HC RX REV CODE- 258: Performed by: STUDENT IN AN ORGANIZED HEALTH CARE EDUCATION/TRAINING PROGRAM

## 2022-08-14 PROCEDURE — 74011250637 HC RX REV CODE- 250/637: Performed by: NURSE PRACTITIONER

## 2022-08-14 PROCEDURE — 80048 BASIC METABOLIC PNL TOTAL CA: CPT

## 2022-08-14 PROCEDURE — 82607 VITAMIN B-12: CPT

## 2022-08-14 PROCEDURE — 74011250636 HC RX REV CODE- 250/636: Performed by: STUDENT IN AN ORGANIZED HEALTH CARE EDUCATION/TRAINING PROGRAM

## 2022-08-14 PROCEDURE — 74011000250 HC RX REV CODE- 250: Performed by: STUDENT IN AN ORGANIZED HEALTH CARE EDUCATION/TRAINING PROGRAM

## 2022-08-14 PROCEDURE — 65270000029 HC RM PRIVATE

## 2022-08-14 PROCEDURE — 36415 COLL VENOUS BLD VENIPUNCTURE: CPT

## 2022-08-14 PROCEDURE — 85025 COMPLETE CBC W/AUTO DIFF WBC: CPT

## 2022-08-14 RX ORDER — NYSTATIN AND TRIAMCINOLONE ACETONIDE 100000; 1 [USP'U]/G; MG/G
OINTMENT TOPICAL 2 TIMES DAILY
Status: DISCONTINUED | OUTPATIENT
Start: 2022-08-14 | End: 2022-08-16 | Stop reason: HOSPADM

## 2022-08-14 RX ADMIN — TAMSULOSIN HYDROCHLORIDE 0.4 MG: 0.4 CAPSULE ORAL at 09:05

## 2022-08-14 RX ADMIN — TIMOLOL MALEATE 1 DROP: 5 SOLUTION OPHTHALMIC at 09:10

## 2022-08-14 RX ADMIN — HEPARIN SODIUM 5000 UNITS: 5000 INJECTION INTRAVENOUS; SUBCUTANEOUS at 06:31

## 2022-08-14 RX ADMIN — DORZOLAMIDE HYDROCHLORIDE 1 DROP: 20 SOLUTION/ DROPS OPHTHALMIC at 17:00

## 2022-08-14 RX ADMIN — HEPARIN SODIUM 5000 UNITS: 5000 INJECTION INTRAVENOUS; SUBCUTANEOUS at 15:18

## 2022-08-14 RX ADMIN — METOPROLOL TARTRATE 12.5 MG: 25 TABLET, FILM COATED ORAL at 09:05

## 2022-08-14 RX ADMIN — ATORVASTATIN CALCIUM 40 MG: 40 TABLET, FILM COATED ORAL at 09:05

## 2022-08-14 RX ADMIN — CEFEPIME 2 G: 2 INJECTION, POWDER, FOR SOLUTION INTRAVENOUS at 16:57

## 2022-08-14 RX ADMIN — DORZOLAMIDE HYDROCHLORIDE 1 DROP: 20 SOLUTION/ DROPS OPHTHALMIC at 09:05

## 2022-08-14 RX ADMIN — LATANOPROST 1 DROP: 50 SOLUTION OPHTHALMIC at 21:47

## 2022-08-14 RX ADMIN — HEPARIN SODIUM 5000 UNITS: 5000 INJECTION INTRAVENOUS; SUBCUTANEOUS at 21:42

## 2022-08-14 RX ADMIN — NYSTATIN AND TRIAMCINOLONE ACETONIDE: 100000; 1 OINTMENT TOPICAL at 21:44

## 2022-08-14 NOTE — PROGRESS NOTES
Renal Dosing/Monitoring  Medication: Cefepime   Current regimen:  1 gm IV every 24 hr  Recent Labs     08/14/22  0356 08/13/22  0338 08/12/22 0354   CREA 1.08 1.46* 1.86*   BUN 35* 42* 38*     Estimated CrCl:  35 ml/min  Plan:   Adjust to 2 gm IV q24 hrs for crcl 30-59

## 2022-08-14 NOTE — PROGRESS NOTES
[facility] [facility] Adult  Hospitalist Group                                                                                          Hospitalist Progress Note  Brianne Larsen MD  Answering service: [phone] OR [phone] from in house phone        Date of Service:  2022  NAME:  Leonel Mistry  :  1930  MRN:  [id number]      Admission Summary:   HPI: \"Osmar Heredia is a 80 y.o. male with a pmhx htn, CAD with past MI, glaucoma, and blindness who presents with nausea and vomiting, and abdominal pain, and was transferred from [facility] for admission for obstructive nephrolithiasis with YAYA. History obtained from chart review due to patient only oriented to self at this time. In the ED, VSS. Labs showed WBC 15.6, creatinine 2.03, (b/l 1.2-1.3), UA with moderate blood, mod LE 50-10 WBC, and negative for bacteria. CT abdomen/pelvis with 2mm stone in the right ureterovesicular junction with mild right hydroureteronephrosis. \"        Interval history / Subjective:   Patient seen examined at bedside earlier.   Awake and talking, voiding adequately      Assessment & Plan:     Acute metabolic encephalopathy on dementia   Pyelonephritis with nephrolithiasis  YAYA  - CT abdomen pelvis confirming a 2 mm UVJ calculus with evidence of right hydro  -Appreciate urology recommendations, s/p R ureteral stent placement   -Continue IV cefepime prelim cultures grew pseudomonas sp requested micro lab to run sensitivity on this   - Follow cultures, will tailor antibiotic therapy based on cultures  -May consider Pyridium for stent colic once YAYA resolved  -Continue IV fluid - yaya improving   - I's and O's  -tsh b12 folate wnl ,CT head negative  PT OT > recommend HH             Code status: dnr   Prophylaxis: Heparin subcu  Care Plan discussed with: Patient/family, nurse, case management  Anticipated Disposition: 24-48 hours     Hospital Problems  Never Reviewed            Codes Class Noted POA    Nephrolithiasis ICD-10-CM: N20.0  ICD-9-CM: 592.0  8/11/2022 Unknown        YAYA (acute kidney injury) [facility]) ICD-10-CM: N17.9  ICD-9-CM: 584.9  8/11/2022 Unknown         Review of Systems:   A comprehensive review of systems was negative except for that written in the HPI. Vital Signs:    Last 24hrs VS reviewed since prior progress note. Most recent are:  Visit Vitals  BP (!) 167/75   Pulse 62   Temp 98.2 °F (36.8 °C)   Resp 18   Ht 5' 6\" (1.676 m)   Wt 57.2 kg (126 lb)   SpO2 96%   BMI 20.34 kg/m²       No intake or output data in the 24 hours ending 08/14/22 1302       Physical Examination:     I had a face to face encounter with this patient and independently examined them on 8/14/2022 as outlined below:          Constitutional:  No acute distress, cooperative, pleasant    ENT:  Oral mucosa moist, oropharynx benign. Resp:  CTA bilaterally. No wheezing/rhonchi/rales. No accessory muscle use. CV:  Regular rhythm, normal rate, no murmurs, gallops, rubs    GI:  Soft, non distended, non tender. normoactive bowel sounds, no hepatosplenomegaly     Musculoskeletal:  No edema, warm, 2+ pulses throughout    Neurologic:  Moves all extremities. AAOx1, CN II-XII reviewed            Data Review:    Review and/or order of clinical lab test  Review and/or order of tests in the radiology section of CPT  Review and/or order of tests in the medicine section of CPT      Labs:     Recent Labs     08/14/22  0356 08/13/22  0338   WBC 10.4 12.9*   HGB 14.0 15.1   HCT 43.5 44.8   * 126*       Recent Labs     08/14/22  0356 08/13/22  0338 08/12/22  0354    143 138   K 3.8 4.7 4.5   * 110* 105   CO2 22 21 21   BUN 35* 42* 38*   CREA 1.08 1.46* 1.86*   GLU 85 83 101*   CA 8.3* 8.7 8.7       Recent Labs     08/11/22  1534   ALT 25      TBILI 0.8   TP 7.4   ALB 3.3*   GLOB 4.1*       No results for input(s): INR, PTP, APTT, INREXT, INREXT in the last 72 hours.    No results for input(s): FE, TIBC, PSAT, FERR in the last 72 hours. Lab Results   Component Value Date/Time    Folate 28.3 (H) 08/14/2022 03:56 AM      No results for input(s): PH, PCO2, PO2 in the last 72 hours. No results for input(s): CPK, CKNDX, TROIQ in the last 72 hours. No lab exists for component: CPKMB  No results found for: CHOL, CHOLX, CHLST, CHOLV, HDL, HDLP, LDL, LDLC, DLDLP, TGLX, TRIGL, TRIGP, CHHD, CHHDX  No results found for: [facility]  Lab Results   Component Value Date/Time    Color YELLOW/STRAW 08/11/2022 08:32 PM    Appearance HAZY (A) 08/11/2022 08:32 PM    Specific gravity 1.023 08/11/2022 08:32 PM    pH (UA) 5.5 08/11/2022 08:32 PM    Protein 100 (A) 08/11/2022 08:32 PM    Glucose Negative 08/11/2022 08:32 PM    Ketone 40 (A) 08/11/2022 08:32 PM    Urobilinogen 0.2 08/11/2022 08:32 PM    Nitrites Negative 08/11/2022 08:32 PM    Leukocyte Esterase MODERATE (A) 08/11/2022 08:32 PM    Epithelial cells FEW 08/11/2022 08:32 PM    Bacteria Negative 08/11/2022 08:32 PM    WBC  08/11/2022 08:32 PM    RBC 5-10 08/11/2022 08:32 PM         Medications Reviewed:     Current Facility-Administered Medications   Medication Dose Route Frequency    cefepime (MAXIPIME) 1 g in 0.9% sodium chloride (MBP/ADV) 50 mL MBP  1 g IntraVENous Q24H    sodium chloride (NS) flush 5-10 mL  5-10 mL IntraVENous PRN    tamsulosin (FLOMAX) capsule 0.4 mg  0.4 mg Oral DAILY    0.9% sodium chloride infusion  75 mL/hr IntraVENous CONTINUOUS    albuterol (PROVENTIL VENTOLIN) nebulizer solution 2.5 mg  2.5 mg Nebulization Q4H PRN    atorvastatin (LIPITOR) tablet 40 mg  40 mg Oral DAILY    dorzolamide (TRUSOPT) 2 % ophthalmic solution 1 Drop  1 Drop Both Eyes BID    latanoprost (XALATAN) 0.005 % ophthalmic solution 1 Drop  1 Drop Right Eye QHS    metoprolol tartrate (LOPRESSOR) tablet 12.5 mg  12.5 mg Oral DAILY    . PHARMACY TO SUBSTITUTE PER PROTOCOL (Reordered from: Rhopressa 0.02 % drop)    Per Protocol    timolol (TIMOPTIC) 0.5 % ophthalmic solution 1 Drop  1 Drop Both Eyes DAILY [address] PHARMACY TO SUBSTITUTE PER PROTOCOL (Reordered from: [facility] 0.024 % drop)    Per Protocol    heparin (porcine) injection 5,000 Units  5,000 Units SubCUTAneous Q8H     ______________________________________________________________________  EXPECTED LENGTH OF STAY: 3d 16h  ACTUAL LENGTH OF STAY:          3                 Segun Naidu MD

## 2022-08-15 LAB
ANION GAP SERPL CALC-SCNC: 9 MMOL/L (ref 5–15)
BASOPHILS # BLD: 0 K/UL (ref 0–0.1)
BASOPHILS NFR BLD: 0 % (ref 0–1)
BUN SERPL-MCNC: 28 MG/DL (ref 6–20)
BUN/CREAT SERPL: 32 (ref 12–20)
CALCIUM SERPL-MCNC: 8.4 MG/DL (ref 8.5–10.1)
CHLORIDE SERPL-SCNC: 110 MMOL/L (ref 97–108)
CO2 SERPL-SCNC: 22 MMOL/L (ref 21–32)
CREAT SERPL-MCNC: 0.88 MG/DL (ref 0.7–1.3)
DIFFERENTIAL METHOD BLD: ABNORMAL
EOSINOPHIL # BLD: 0.2 K/UL (ref 0–0.4)
EOSINOPHIL NFR BLD: 2 % (ref 0–7)
ERYTHROCYTE [DISTWIDTH] IN BLOOD BY AUTOMATED COUNT: 12.4 % (ref 11.5–14.5)
GLUCOSE SERPL-MCNC: 88 MG/DL (ref 65–100)
HCT VFR BLD AUTO: 41.7 % (ref 36.6–50.3)
HGB BLD-MCNC: 14 G/DL (ref 12.1–17)
IMM GRANULOCYTES # BLD AUTO: 0 K/UL (ref 0–0.04)
IMM GRANULOCYTES NFR BLD AUTO: 0 % (ref 0–0.5)
LYMPHOCYTES # BLD: 0.8 K/UL (ref 0.8–3.5)
LYMPHOCYTES NFR BLD: 9 % (ref 12–49)
MCH RBC QN AUTO: 32.2 PG (ref 26–34)
MCHC RBC AUTO-ENTMCNC: 33.6 G/DL (ref 30–36.5)
MCV RBC AUTO: 95.9 FL (ref 80–99)
MONOCYTES # BLD: 0.7 K/UL (ref 0–1)
MONOCYTES NFR BLD: 8 % (ref 5–13)
NEUTS SEG # BLD: 7.1 K/UL (ref 1.8–8)
NEUTS SEG NFR BLD: 82 % (ref 32–75)
NRBC # BLD: 0 K/UL (ref 0–0.01)
NRBC BLD-RTO: 0 PER 100 WBC
PLATELET # BLD AUTO: 135 K/UL (ref 150–400)
PMV BLD AUTO: 11.1 FL (ref 8.9–12.9)
POTASSIUM SERPL-SCNC: 3.7 MMOL/L (ref 3.5–5.1)
RBC # BLD AUTO: 4.35 M/UL (ref 4.1–5.7)
SODIUM SERPL-SCNC: 141 MMOL/L (ref 136–145)
WBC # BLD AUTO: 8.7 K/UL (ref 4.1–11.1)

## 2022-08-15 PROCEDURE — 36415 COLL VENOUS BLD VENIPUNCTURE: CPT

## 2022-08-15 PROCEDURE — 74011000258 HC RX REV CODE- 258: Performed by: STUDENT IN AN ORGANIZED HEALTH CARE EDUCATION/TRAINING PROGRAM

## 2022-08-15 PROCEDURE — 65270000029 HC RM PRIVATE

## 2022-08-15 PROCEDURE — 80048 BASIC METABOLIC PNL TOTAL CA: CPT

## 2022-08-15 PROCEDURE — 85025 COMPLETE CBC W/AUTO DIFF WBC: CPT

## 2022-08-15 PROCEDURE — 74011250637 HC RX REV CODE- 250/637: Performed by: NURSE PRACTITIONER

## 2022-08-15 PROCEDURE — 97535 SELF CARE MNGMENT TRAINING: CPT

## 2022-08-15 PROCEDURE — 74011250637 HC RX REV CODE- 250/637: Performed by: STUDENT IN AN ORGANIZED HEALTH CARE EDUCATION/TRAINING PROGRAM

## 2022-08-15 PROCEDURE — 97116 GAIT TRAINING THERAPY: CPT

## 2022-08-15 PROCEDURE — 74011250636 HC RX REV CODE- 250/636: Performed by: STUDENT IN AN ORGANIZED HEALTH CARE EDUCATION/TRAINING PROGRAM

## 2022-08-15 PROCEDURE — 74011000250 HC RX REV CODE- 250: Performed by: FAMILY MEDICINE

## 2022-08-15 PROCEDURE — 97530 THERAPEUTIC ACTIVITIES: CPT

## 2022-08-15 RX ADMIN — ATORVASTATIN CALCIUM 40 MG: 40 TABLET, FILM COATED ORAL at 10:26

## 2022-08-15 RX ADMIN — TAMSULOSIN HYDROCHLORIDE 0.4 MG: 0.4 CAPSULE ORAL at 10:26

## 2022-08-15 RX ADMIN — DORZOLAMIDE HYDROCHLORIDE 1 DROP: 20 SOLUTION/ DROPS OPHTHALMIC at 10:29

## 2022-08-15 RX ADMIN — LATANOPROST 1 DROP: 50 SOLUTION OPHTHALMIC at 22:17

## 2022-08-15 RX ADMIN — NYSTATIN AND TRIAMCINOLONE ACETONIDE: 100000; 1 OINTMENT TOPICAL at 10:26

## 2022-08-15 RX ADMIN — METOPROLOL TARTRATE 12.5 MG: 25 TABLET, FILM COATED ORAL at 10:26

## 2022-08-15 RX ADMIN — SODIUM CHLORIDE, PRESERVATIVE FREE 10 ML: 5 INJECTION INTRAVENOUS at 22:17

## 2022-08-15 RX ADMIN — HEPARIN SODIUM 5000 UNITS: 5000 INJECTION INTRAVENOUS; SUBCUTANEOUS at 05:13

## 2022-08-15 RX ADMIN — CEFEPIME 2 G: 2 INJECTION, POWDER, FOR SOLUTION INTRAVENOUS at 18:14

## 2022-08-15 RX ADMIN — NYSTATIN AND TRIAMCINOLONE ACETONIDE: 100000; 1 OINTMENT TOPICAL at 18:15

## 2022-08-15 RX ADMIN — TIMOLOL MALEATE 1 DROP: 5 SOLUTION OPHTHALMIC at 10:29

## 2022-08-15 RX ADMIN — HEPARIN SODIUM 5000 UNITS: 5000 INJECTION INTRAVENOUS; SUBCUTANEOUS at 22:17

## 2022-08-15 RX ADMIN — HEPARIN SODIUM 5000 UNITS: 5000 INJECTION INTRAVENOUS; SUBCUTANEOUS at 18:14

## 2022-08-15 RX ADMIN — DORZOLAMIDE HYDROCHLORIDE 1 DROP: 20 SOLUTION/ DROPS OPHTHALMIC at 18:14

## 2022-08-15 NOTE — PROGRESS NOTES
Bedside shift change report given to Bard Winter RN (oncoming nurse) by [employer] [facility], RN (offgoing nurse). Report included the following information SBAR, Kardex, ED Summary, OR Summary, Procedure Summary, Intake/Output, MAR, Recent Results, and Med Rec Status.

## 2022-08-15 NOTE — PROGRESS NOTES
Spiritual Care Assessment/Progress Note  [facility]      NAME: Danika Jackson      MRN: [id number]  AGE: 80 y.o. SEX: male  Episcopalian Affiliation: Gnosticism   Language: English     8/15/2022     Total Time (in minutes): 5     Spiritual Assessment begun in [facility] [phone] through conversation with:         [x]Patient        [] Family    [] Friend(s)        Reason for Consult: [facility]     Spiritual beliefs: (Please include comment if needed)     [x] Identifies with a ford tradition:  Restoration       [] Supported by a ford community:            [] Claims no spiritual orientation:           [] Seeking spiritual identity:                [] Adheres to an individual form of spirituality:           [] Not able to assess:                           Identified resources for coping:      [x] Prayer                               [x] Music                  [] Guided Imagery     [x] Family/friends                 [] Pet visits     [] Devotional reading                         [] Unknown     [] Other:                                               Interventions offered during this visit: (See comments for more details)    Patient Interventions: Affirmation of ford, Communion (Gnosticism), Initial/Spiritual assessment, patient floor, Prayer (actual), Prayer (assurance of)           Plan of Care:     [x] Support spiritual and/or cultural needs    [] Support AMD and/or advance care planning process      [] Support grieving process   [] Coordinate Rites and/or Rituals    [] Coordination with community clergy   [] No spiritual needs identified at this time   [] Detailed Plan of Care below (See Comments)  [] Make referral to Music Therapy  [] Make referral to Pet Therapy     [] Make referral to Addiction services  [] Make referral to [facility]  [] Make referral to Spiritual Care Partner  [] No future visits requested        [] Contact Spiritual Care for further referrals     Comments: Mr. Varinder Vyas was asleep. No family was present at the time of the visit. Prayer for spiritual communion offered.     POLLO Pruitt, RN, ACSW, LCSW   Page:  [phone]

## 2022-08-15 NOTE — ANESTHESIA POSTPROCEDURE EVALUATION
Procedure(s):  CYSTOSCOPY, RIGHT RETROGRADE PYELOGRAM, RIGHT URETERAL STENT PLACEMENT. MAC    Anesthesia Post Evaluation      Multimodal analgesia: multimodal analgesia not used between 6 hours prior to anesthesia start to PACU discharge  Patient location during evaluation: PACU  Patient participation: complete - patient participated  Level of consciousness: awake  Pain score: 0  Pain management: adequate  Airway patency: patent  Anesthetic complications: no  Cardiovascular status: acceptable  Respiratory status: acceptable  Hydration status: acceptable  Comments: I have evaluated the patient and meets criteria for discharge from PACU.  Jim Arango MD    Final Post Anesthesia Temperature Assessment:  Normothermia (36.0-37.5 degrees C)      INITIAL Post-op Vital signs:   Vitals Value Taken Time   /66 08/12/22 1726   Temp 36.4 °C (97.6 °F) 08/12/22 1701   Pulse 78 08/12/22 1726   Resp 19 08/12/22 1726   SpO2 100 % 08/12/22 1726

## 2022-08-15 NOTE — PROGRESS NOTES
OCCUPATIONAL THERAPY TREATMENT  Patient: Hernan Bowers (80 y.o. male)  Date: 8/15/2022  Diagnosis: YAYA (acute kidney injury) ([facility] [address] [N17.9]  Nephrolithiasis [N20.0] <principal problem not specified>  Procedure(s) (LRB):  CYSTOSCOPY, RIGHT RETROGRADE PYELOGRAM, RIGHT URETERAL STENT PLACEMENT (Right) 3 Days Post-Op  Precautions: Falls   Chart, occupational therapy assessment, plan of care, and goals were reviewed. ASSESSMENT  Patient continues with skilled OT services and is progressing towards goals. Pt received on commode with nursing tech present. Pt had bowel accident. Pt needed mod assist to clean maday area in standing d/t decrease thoroughness. Pt in good spirits and demonstrating improved activity tolerance and balance, however, still unsteady on his feet. Feel pt is approaching baseline. Will con't to follow and address listed goals. Current Level of Function Impacting Discharge (ADLs): mod assist with toileting, hygiene    Other factors to consider for discharge:          PLAN :  Patient continues to benefit from skilled intervention to address the above impairments. Continue treatment per established plan of care to address goals. Recommend with staff: access bathroom with assistance    Recommend next OT session: see goals    Recommendation for discharge: (in order for the patient to meet his/her long term goals)  No skilled occupational therapy/ follow up rehabilitation needs identified at this time. This discharge recommendation:  A follow-up discussion with the attending provider and/or case management is planned    IF patient discharges home will need the following DME: none       SUBJECTIVE:   Patient stated I have a twin brother who lives in [place].     OBJECTIVE DATA SUMMARY:   Cognitive/Behavioral Status:  Neurologic State: Alert  Orientation Level: Oriented X4  Cognition: Memory loss        Safety/Judgement: Awareness of environment    Functional Mobility and Transfers for ADLs:  Bed Mobility:       Transfers:  Sit to Stand: Contact guard assistance  Functional Transfers  Bathroom Mobility: Contact guard assistance  Toilet Transfer : Contact guard assistance       Balance:  Sitting: Intact; Without support  Standing: Impaired; Without support  Standing - Static: Fair;Occasional  Standing - Dynamic : Fair;Occasional    ADL Intervention:       Grooming  Position Performed: Standing  Washing Hands: Stand-by assistance              Lower Body Bathing  Perineal  : Moderate assistance  Position Performed: Seated on toilet;Standing    Upper Body [address]: Minimum  assistance    Lower Body Dressing Assistance  Socks: Set-up  Slip on Shoes with Back: Set-up    Toileting  Bowel Hygiene:  Moderate assistance    Cognitive Retraining  Safety/Judgement: Awareness of environment      Pain:  No c/o of pain    Activity Tolerance:   Good    After treatment patient left in no apparent distress:   Sitting in chair, Call bell within reach, and Bed / chair alarm activated    COMMUNICATION/COLLABORATION:   The patients plan of care was discussed with: Physical therapist.     Ga Gallardo OTR/L  Time Calculation: 26 mins

## 2022-08-15 NOTE — PROGRESS NOTES
Problem: Mobility Impaired (Adult and Pediatric)  Goal: *Acute Goals and Plan of Care (Insert Text)  Description: FUNCTIONAL STATUS PRIOR TO ADMISSION: Per daughter, pt indep with functional mobility and ADLs at baseline. Lives with daughter who provides supervision due to pt with h/o intermittent confusion. Daughter works from home. HOME SUPPORT PRIOR TO ADMISSION: The patient lived with daughter; two local granddaughters, one of whom is RN. Physical Therapy Goals  Initiated 8/13/2022  1. Patient will move from supine to sit and sit to supine  and roll side to side in bed with supervision/set-up within 7 day(s). 2.  Patient will transfer from bed to chair and chair to bed with contact guard assist using the least restrictive device within 7 day(s). 3.  Patient will perform sit to stand with contact guard assist within 7 day(s). 4.  Patient will ambulate with contact guard assist for 150 feet with the least restrictive device within 7 day(s). 5.  Patient will ascend/descend 4 stairs with bilat handrail(s) with minimal assistance/contact guard assist within 7 day(s). Outcome: Progressing Towards Goal     PHYSICAL THERAPY TREATMENT  Patient: Erin Martini (80 y.o. male)  Date: 8/15/2022  Diagnosis: YAYA (acute kidney injury) ([facility] [address] [N17.9]  Nephrolithiasis [N20.0] <principal problem not specified>  Procedure(s) (LRB):  CYSTOSCOPY, RIGHT RETROGRADE PYELOGRAM, RIGHT URETERAL STENT PLACEMENT (Right) 3 Days Post-Op  Precautions:    Chart, physical therapy assessment, plan of care and goals were reviewed. ASSESSMENT  Patient continues with skilled PT services and is progressing towards goals. Patient presents with overall improvement in activity tolerance, gait, and mentation (no longer confused, does not perseverate, follows commands). He continues with mild balance impairment with LOB requiring assist at times to prevent falls when ambulating and in static stance.    Pt may benefit from gait training with an assistive device (cane) next session. Pt in agreement, owns a cane. Current Level of Function Impacting Discharge (mobility/balance): CGA sit<->stand; CGA to Min A ambulating 200 ft; Unsteady ambulating and in static stance (forward lean with LOB requiring assist)    Other factors to consider for discharge: indep plof, lives w/ daughter who works from home         PLAN :  Patient continues to benefit from skilled intervention to address the above impairments. Continue treatment per established plan of care. to address goals. Recommendation for discharge: (in order for the patient to meet his/her long term goals)  Physical therapy at least 2 days/week in the home AND ensure assist and/or supervision for safety with household mobility    This discharge recommendation:  Has not yet been discussed the attending provider and/or case management    IF patient discharges home will need the following DME: to be determined (TBD)       SUBJECTIVE:       OBJECTIVE DATA SUMMARY:   Critical Behavior:  Neurologic State: Alert  Orientation Level: Oriented X4  Cognition: Appropriate decision making, Appropriate safety awareness, Follows commands  Safety/Judgement: Decreased insight into deficits  Functional Mobility Training:  Bed Mobility:  Received OOB and remained sitting in chair after session                 Transfers:  Sit to Stand: Contact guard assistance  Stand to Sit: Stand-by assistance                             Balance:  Sitting: Intact; Without support  Standing: Impaired; Without support  Standing - Static: Fair;Occasional  Standing - Dynamic : Fair;Occasional  Ambulation/Gait Training:  Distance (ft): 200 Feet (ft)  Assistive Device: Gait belt  Ambulation - Level of Assistance: Minimal assistance;Contact guard assistance;Assist x1        Gait Abnormalities: Decreased step clearance; Path deviations              Speed/Ana Luisa: Accelerated  Intervention - Cues for slower ana luisa, upright posture. Educated in benefit of AD. Pt owns a cane. May try one next session. Therapeutic Exercises:   Static stand balance x30 secs - slow gradual forward lean requiring Min A to prevent falling forward. Pain Rating:      Activity Tolerance:   Good and Fair    After treatment patient left in no apparent distress:   Sitting in chair, Call bell within reach, and Bed / chair alarm activated    COMMUNICATION/COLLABORATION:   The patients plan of care was discussed with: Occupational therapist and Registered nurse.      Cedric Umana, PT   Time Calculation: 25 mins

## 2022-08-15 NOTE — PROGRESS NOTES
RUR: 13% Low      DANAY: Home health PT/OT with [facility] (p: [phone]). Patient's daughter will provide transport home once medically stable. Follow-up with PCP/specialist.     Primary Contact: Daughter, Flor Neil, [phone]     *Will need 2nd IM letter prior or discharge. 11:30AM - CM reviewed chart. Per review and ID rounds, awaiting urine culture results. CM noted HH PT/OT order. Referral sent to [facility] via [employer] (p: [phone]) and accepted. AVS updated. Discharge pending medical progress and final recommendations. CM will continue to follow as needed. Transition of Care Plan:     The Plan for Transition of Care is related to the following treatment goals: Home health PT/OT    The Patient and/or patient representative, daughter was provided with a choice of provider and agrees  with the discharge plan. Yes [x] No []    A Freedom of choice list was provided with basic dialogue that supports the patient's individualized plan of care/goals and shares the quality data associated with the providers.        Yes [x] No []      Zabrina Ibarra, [facility]   [phone]

## 2022-08-15 NOTE — PROGRESS NOTES
[address] [facility] Adult  Hospitalist Group                                                                                          Hospitalist Progress Note  Ann Waldron MD  Answering service: [phone] OR 36 from in house phone        Date of Service:  8/15/2022  NAME:  Slim Loyola  :  1930  MRN:  [id number]      Admission Summary:   HPI: \"Osmar Mendenhall is a 80 y.o. male with a pmhx htn, CAD with past MI, glaucoma, and blindness who presents with nausea and vomiting, and abdominal pain, and was transferred from [facility] for admission for obstructive nephrolithiasis with YAYA. History obtained from chart review due to patient only oriented to self at this time. In the ED, VSS. Labs showed WBC 15.6, creatinine 2.03, (b/l 1.2-1.3), UA with moderate blood, mod LE 50-10 WBC, and negative for bacteria. CT abdomen/pelvis with 2mm stone in the right ureterovesicular junction with mild right hydroureteronephrosis. \"        Interval history / Subjective:   Patient seen examined at bedside earlier.   Awake and talking, voiding adequately      Assessment & Plan:     Acute metabolic encephalopathy on dementia   Pyelonephritis with nephrolithiasis  YAYA -resolved   - CT abdomen pelvis confirming a 2 mm UVJ calculus with evidence of right hydro  -Appreciate urology recommendations, s/p R ureteral stent placement   -Continue IV cefepime prelim cultures grew pseudomonas sp requested micro lab to run sensitivity on this - still pending   - Follow cultures, will tailor antibiotic therapy based on cultures  -will give Pyridium for stent colic on dc   -stop IVF yaya resolved    - I's and O's  -tsh b12 folate wnl ,CT head negative  PT OT > recommend HH             Code status: dnr   Prophylaxis: Heparin subcu  Care Plan discussed with: Patient/family, nurse, case management  Anticipated Disposition: 24 hours once sensitivities finalized     Hospital Problems  Never Reviewed            Codes Class Noted POA    Nephrolithiasis ICD-10-CM: N20.0  ICD-9-CM: 592.0  8/11/2022 Unknown        YAYA (acute kidney injury) [facility]) ICD-10-CM: N17.9  ICD-9-CM: 584.9  8/11/2022 Unknown         Review of Systems:   A comprehensive review of systems was negative except for that written in the HPI. Vital Signs:    Last 24hrs VS reviewed since prior progress note. Most recent are:  Visit Vitals  BP (!) 166/47   Pulse 72   Temp 98 °F (36.7 °C)   Resp 16   Ht 5' 6\" (1.676 m)   Wt 57.8 kg (127 lb 6.8 oz)   SpO2 96%   BMI 20.57 kg/m²         Intake/Output Summary (Last 24 hours) at 8/15/2022 1406  Last data filed at 8/15/2022 0455  Gross per 24 hour   Intake --   Output 475 ml   Net -475 ml          Physical Examination:     I had a face to face encounter with this patient and independently examined them on 8/15/2022 as outlined below:          Constitutional:  No acute distress, cooperative, pleasant    ENT:  Oral mucosa moist, oropharynx benign. Resp:  CTA bilaterally. No wheezing/rhonchi/rales. No accessory muscle use. CV:  Regular rhythm, normal rate, no murmurs, gallops, rubs    GI:  Soft, non distended, non tender. normoactive bowel sounds, no hepatosplenomegaly     Musculoskeletal:  No edema, warm, 2+ pulses throughout    Neurologic:  Moves all extremities. AAOx1, CN II-XII reviewed            Data Review:    Review and/or order of clinical lab test  Review and/or order of tests in the radiology section of CPT  Review and/or order of tests in the medicine section of CPT      Labs:     Recent Labs     08/15/22  0508 08/14/22  0356   WBC 8.7 10.4   HGB 14.0 14.0   HCT 41.7 43.5   * 131*       Recent Labs     08/15/22  0108 08/14/22  0356 08/13/22  0338    142 143   K 3.7 3.8 4.7   * 112* 110*   CO2 22 22 21   BUN 28* 35* 42*   CREA 0.88 1.08 1.46*   GLU 88 85 83   CA 8.4* 8.3* 8.7       No results for input(s): ALT, AP, TBIL, TBILI, TP, ALB, GLOB, GGT, AML, LPSE in the last 72 hours.     No lab exists for component: SGOT, GPT, AMYP, HLPSE    No results for input(s): INR, PTP, APTT, INREXT, INREXT in the last 72 hours. No results for input(s): FE, TIBC, PSAT, FERR in the last 72 hours. Lab Results   Component Value Date/Time    Folate 28.3 (H) 08/14/2022 03:56 AM      No results for input(s): PH, PCO2, PO2 in the last 72 hours. No results for input(s): CPK, CKNDX, TROIQ in the last 72 hours.     No lab exists for component: CPKMB  No results found for: CHOL, CHOLX, CHLST, CHOLV, HDL, HDLP, LDL, LDLC, DLDLP, TGLX, TRIGL, TRIGP, CHHD, CHHDX  No results found for: [facility]  Lab Results   Component Value Date/Time    Color YELLOW/STRAW 08/11/2022 08:32 PM    Appearance HAZY (A) 08/11/2022 08:32 PM    Specific gravity 1.023 08/11/2022 08:32 PM    pH (UA) 5.5 08/11/2022 08:32 PM    Protein 100 (A) 08/11/2022 08:32 PM    Glucose Negative 08/11/2022 08:32 PM    Ketone 40 (A) 08/11/2022 08:32 PM    Urobilinogen 0.2 08/11/2022 08:32 PM    Nitrites Negative 08/11/2022 08:32 PM    Leukocyte Esterase MODERATE (A) 08/11/2022 08:32 PM    Epithelial cells FEW 08/11/2022 08:32 PM    Bacteria Negative 08/11/2022 08:32 PM    WBC  08/11/2022 08:32 PM    RBC 5-10 08/11/2022 08:32 PM         Medications Reviewed:     Current Facility-Administered Medications   Medication Dose Route Frequency    cefepime (MAXIPIME) 2 g in 0.9% sodium chloride (MBP/ADV) 100 mL MBP  2 g IntraVENous Q24H    nystatin-triamcinolone (MYCOLOG) 100,000-0.1 unit/gram-% ointment   Topical BID    sodium chloride (NS) flush 5-10 mL  5-10 mL IntraVENous PRN    tamsulosin (FLOMAX) capsule 0.4 mg  0.4 mg Oral DAILY    0.9% sodium chloride infusion  75 mL/hr IntraVENous CONTINUOUS    albuterol (PROVENTIL VENTOLIN) nebulizer solution 2.5 mg  2.5 mg Nebulization Q4H PRN    atorvastatin (LIPITOR) tablet 40 mg  40 mg Oral DAILY    dorzolamide (TRUSOPT) 2 % ophthalmic solution 1 Drop  1 Drop Both Eyes BID    latanoprost (XALATAN) 0.005 % ophthalmic solution 1 Drop  1 Drop Right Eye QHS    metoprolol tartrate (LOPRESSOR) tablet 12.5 mg  12.5 mg Oral DAILY    . PHARMACY TO SUBSTITUTE PER PROTOCOL (Reordered from: Rhopressa 0.02 % drop)    Per Protocol    timolol (TIMOPTIC) 0.5 % ophthalmic solution 1 Drop  1 Drop Both Eyes DAILY    . PHARMACY TO SUBSTITUTE PER PROTOCOL (Reordered from: Vyzulta 0.024 % drop)    Per Protocol    heparin (porcine) injection 5,000 Units  5,000 Units SubCUTAneous Q8H     ______________________________________________________________________  EXPECTED LENGTH OF STAY: 3d 16h  ACTUAL LENGTH OF STAY:          4                 Aquiles Moore MD

## 2022-08-16 VITALS
TEMPERATURE: 97.9 F | WEIGHT: 127.43 LBS | HEIGHT: 66 IN | SYSTOLIC BLOOD PRESSURE: 127 MMHG | BODY MASS INDEX: 20.48 KG/M2 | OXYGEN SATURATION: 94 % | HEART RATE: 74 BPM | DIASTOLIC BLOOD PRESSURE: 69 MMHG | RESPIRATION RATE: 18 BRPM

## 2022-08-16 LAB
BACTERIA SPEC CULT: ABNORMAL
CC UR VC: ABNORMAL
SERVICE CMNT-IMP: ABNORMAL

## 2022-08-16 PROCEDURE — 74011250637 HC RX REV CODE- 250/637: Performed by: NURSE PRACTITIONER

## 2022-08-16 PROCEDURE — 74011250637 HC RX REV CODE- 250/637: Performed by: STUDENT IN AN ORGANIZED HEALTH CARE EDUCATION/TRAINING PROGRAM

## 2022-08-16 PROCEDURE — 97110 THERAPEUTIC EXERCISES: CPT

## 2022-08-16 PROCEDURE — 74011250636 HC RX REV CODE- 250/636: Performed by: STUDENT IN AN ORGANIZED HEALTH CARE EDUCATION/TRAINING PROGRAM

## 2022-08-16 RX ORDER — TAMSULOSIN HYDROCHLORIDE 0.4 MG/1
0.4 CAPSULE ORAL DAILY
Qty: 30 CAPSULE | Refills: 0 | Status: SHIPPED | OUTPATIENT
Start: 2022-08-17 | End: 2022-09-16

## 2022-08-16 RX ORDER — SAME BUTANEDISULFONATE/BETAINE 400-600 MG
250 POWDER IN PACKET (EA) ORAL 2 TIMES DAILY
Qty: 18 CAPSULE | Refills: 0 | Status: SHIPPED | OUTPATIENT
Start: 2022-08-16 | End: 2022-08-25

## 2022-08-16 RX ORDER — PHENAZOPYRIDINE HYDROCHLORIDE 200 MG/1
100 TABLET, FILM COATED ORAL
Qty: 5 TABLET | Refills: 0 | Status: SHIPPED | OUTPATIENT
Start: 2022-08-16 | End: 2022-08-19

## 2022-08-16 RX ORDER — NYSTATIN AND TRIAMCINOLONE ACETONIDE 100000; 1 [USP'U]/G; MG/G
1 OINTMENT TOPICAL 2 TIMES DAILY
Qty: 30 G | Refills: 0 | Status: SHIPPED | OUTPATIENT
Start: 2022-08-16

## 2022-08-16 RX ORDER — LEVOFLOXACIN 500 MG/1
750 TABLET, FILM COATED ORAL DAILY
Qty: 14 TABLET | Refills: 0 | Status: SHIPPED | OUTPATIENT
Start: 2022-08-16 | End: 2022-08-25

## 2022-08-16 RX ADMIN — TIMOLOL MALEATE 1 DROP: 5 SOLUTION OPHTHALMIC at 10:15

## 2022-08-16 RX ADMIN — ATORVASTATIN CALCIUM 40 MG: 40 TABLET, FILM COATED ORAL at 10:13

## 2022-08-16 RX ADMIN — TAMSULOSIN HYDROCHLORIDE 0.4 MG: 0.4 CAPSULE ORAL at 10:13

## 2022-08-16 RX ADMIN — NYSTATIN AND TRIAMCINOLONE ACETONIDE: 100000; 1 OINTMENT TOPICAL at 10:14

## 2022-08-16 RX ADMIN — METOPROLOL TARTRATE 12.5 MG: 25 TABLET, FILM COATED ORAL at 10:13

## 2022-08-16 RX ADMIN — DORZOLAMIDE HYDROCHLORIDE 1 DROP: 20 SOLUTION/ DROPS OPHTHALMIC at 10:15

## 2022-08-16 RX ADMIN — HEPARIN SODIUM 5000 UNITS: 5000 INJECTION INTRAVENOUS; SUBCUTANEOUS at 06:08

## 2022-08-16 NOTE — PROGRESS NOTES
Physician Progress Note      PATIENT:               Maribel Levi  CSN #:                  [id number]  :                       1930  ADMIT DATE:       2022 3:17 PM  [address] [place] DATE:        2022 2:10 PM  RESPONDING  PROVIDER #:        Ryan Kruse MD          QUERY TEXT:    Patient admitted with Pyelonephritis . Noted documentation of Dehydration. In order to support the diagnosis of dehydration, please include additional clinical indicators in your documentation. Or please document if the diagnosis of dehydration has been ruled out after further study. The medical record reflects the following:  Risk Factors: Pyelonephritis  KENAN    Clinical Indicators:   ED  ED:  Dehydration    His daughter brought him in because she was concerned that he was not keeping up fluids down and becoming dehydrated    BUN 39 - 38 - 42 - 35 - 28   UA: Hazy, 100 protein, 40 ketone, mod. blood, mod. leuks, 0 bacteria      Treatment:  0.9% sodium chloride infusion  lactated Ringers infusion 1,000 mL x2    Thank you,  Teresa Gee RN [facility]  Clinical Documentation  [phone] or via [address] [place] [address] provided:  -- Dehydration present as evidenced by, Please document evidence.   -- Dehydration was ruled out  -- Other - I will add my own diagnosis  -- Disagree - Not applicable / Not valid  -- Disagree - Clinically unable to determine / Unknown  -- Refer to Clinical Documentation Reviewer    PROVIDER RESPONSE TEXT:    Dehydration is present as evidenced by kenan    Query created by: Janelle Lovelace on 2022 5:50 PM      Electronically signed by:  Ryan Kruse MD 2022 5:51 PM

## 2022-08-16 NOTE — PROGRESS NOTES
RUR: 13% Low      DANAY: Home health PT/OT with [facility] (p: [phone]). Patient's daughter will provide transport home once medically stable. Follow-up with PCP/specialist.     Primary Contact: Daughter, Javier Phelan, [phone]     Medicare pt has received, reviewed, and signed 2nd IM letter informing them of their right to appeal the discharge. Signed copied has been placed on pt bedside chart. 11:30AM - CM noted discharge order. CM provided update to [facility] that patient will be discharging home today, Tuesday, 8/16. Patient's daughter will provide transport. No further CM needs.     Lorie Duenas, MSW   [phone]

## 2022-08-16 NOTE — DISCHARGE SUMMARY
Discharge Summary       PATIENT ID: Joe Guajardo  MRN: [id number]   YOB: 1930    DATE OF ADMISSION: 8/11/2022  3:17 PM    DATE OF DISCHARGE: 08/16/22    PRIMARY CARE PROVIDER: None     ATTENDING PHYSICIAN: Sameer Ayala MD   DISCHARGING PROVIDER: Sameer Ayala MD    To contact this individual call [phone] and ask the  to page. If unavailable ask to be transferred the Adult Hospitalist Department. CONSULTATIONS: IP CONSULT TO UROLOGY    PROCEDURES/SURGERIES: Procedure(s):  CYSTOSCOPY, RIGHT RETROGRADE PYELOGRAM, RIGHT URETERAL STENT PLACEMENT    ADMITTING DIAGNOSES & HOSPITAL COURSE:   Patient managed for pyonephritis with nephrolithiasis status post right ureteral stent placed 8/12 by urology. Cultures grew Pseudomonas pansensitive. Continue on IV cefepime, discharged with p.o. Levaquin to complete 14 days total (9 more days of po). Follow-up outpatient with PCP, urology. DC with as needed Pyridium and Mycolog for urinary colic. DISCHARGE DIAGNOSES / PLAN:      Acute metabolic encephalopathy on dementia  Pyelonephritis with nephrolithiasis  YAYA -resolved   - CT abdomen pelvis confirming a 2 mm UVJ calculus with evidence of right hydro  -Appreciate urology recommendations, s/p R ureteral stent placement 8/12  -Continue IV cefepime prelim cultures grew pseudomonas sensitive to Levaquin DC with p.o.  Levaquin  -will give Pyridium for stent colic on dc   -stop IVF yaya resolved    - I's and O's  -tsh b12 folate wnl ,CT head negative  PT OT > recommend               Code status: dnr  Prophylaxis: Heparin subcu  Care Plan discussed with: Patient/family, nurse, case management  Anticipated Disposition:          FOLLOW UP APPOINTMENTS:    Follow-up Information       Follow up With Specialties Details Why Contact Info     [address] Follow up  [address] [facility] [address] [phone]    None    None (395) Patient stated that they have no PCP      Nikia Ballesteros MD Urology Call in 1 day(s)  [place]  [phone]                  ADDITIONAL CARE RECOMMENDATIONS: Repeat CBC, BMP 3 to 5 days    DIET: Resume previous diet    ACTIVITY: Activity as tolerated      DISCHARGE MEDICATIONS:  Current Discharge Medication List        START taking these medications    Details   tamsulosin (FLOMAX) 0.4 mg capsule Take 1 Capsule by mouth in the morning for 30 days. Qty: 30 Capsule, Refills: 0  Start date: 8/17/2022, End date: 9/16/2022      levoFLOXacin (Levaquin) 500 mg tablet Take 1.5 Tablets by mouth in the morning for 9 days. Qty: 14 Tablet, Refills: 0  Start date: 8/16/2022, End date: 8/25/2022      Saccharomyces boulardii (Florastor) 250 mg capsule Take 1 Capsule by mouth two (2) times a day for 9 days. Qty: 18 Capsule, Refills: 0  Start date: 8/16/2022, End date: 8/25/2022      phenazopyridine (Pyridium) 200 mg tablet Take 0.5 Tablets by mouth three (3) times daily as needed for Pain for up to 3 days. Qty: 5 Tablet, Refills: 0  Start date: 8/16/2022, End date: 8/19/2022      nystatin-triamcinolone (MYCOLOG) 100,000-0.1 unit/gram-% ointment Apply 1 Tube to affected area two (2) times a day. Qty: 30 g, Refills: 0  Start date: 8/16/2022           CONTINUE these medications which have NOT CHANGED    Details   atorvastatin (LIPITOR) 40 mg tablet Take 40 mg by mouth in the morning. brinzolamide (AZOPT) 1 % ophthalmic suspension Administer 1 Drop to both eyes two (2) times a day. Vyzulta 0.024 % drop INSTILL 1 DROP IN LEFT EYE EVERY DAY IN THE EVENING      latanoprost (XALATAN) 0.005 % ophthalmic solution INSTILL 1 DROP IN RIGHT EYE EVERY DAY IN THE EVENING      metoprolol tartrate (LOPRESSOR) 25 mg tablet Take 12.5 mg by mouth in the morning.       Rhopressa 0.02 % drop INSTILL 1 DROP IN LEFT EYE EVERY DAY IN THE EVENING      timolol (TIMOPTIC) 0.5 % ophthalmic solution INSTILL 1 DROP IN BOTH EYES EVERY MORNING      albuterol (PROVENTIL VENTOLIN) 2.5 mg /3 mL (0.083 %) nebu 3 mL. clopidogrel bisulfate (PLAVIX PO) Plavix               NOTIFY YOUR PHYSICIAN FOR ANY OF THE FOLLOWING:   Fever over 101 degrees for 24 hours. Chest pain, shortness of breath, fever, chills, nausea, vomiting, diarrhea, change in mentation, falling, weakness, bleeding. Severe pain or pain not relieved by medications. Or, any other signs or symptoms that you may have questions about. DISPOSITION:    Home With:   OT  PT x HH  RN       Long term SNF/Inpatient Rehab    Independent/assisted living    Hospice    Other:       PATIENT CONDITION AT DISCHARGE:     Functional status    Poor    x Deconditioned     Independent      Cognition     Lucid     Forgetful    x Dementia          Code status     Full code    x DNR      PHYSICAL EXAMINATION AT DISCHARGE:   I had a face to face encounter with this patient and independently examined them on 8/16/2022 as outlined below:                                                       Constitutional:  No acute distress, cooperative, pleasant    ENT:  Oral mucosa moist, oropharynx benign. Resp:  CTA bilaterally. No wheezing/rhonchi/rales. No accessory muscle use. CV:  Regular rhythm, normal rate, no murmurs, gallops, rubs    GI:  Soft, non distended, non tender. normoactive bowel sounds, no hepatosplenomegaly    Musculoskeletal:  No edema, warm, 2+ pulses throughout    Neurologic:  Moves all extremities.   AAOx1, CN II-XII reviewed                                 CHRONIC MEDICAL DIAGNOSES:  Problem List as of 8/16/2022 Never Reviewed            Codes Class Noted - Resolved    Nephrolithiasis ICD-10-CM: N20.0  ICD-9-CM: 592.0  8/11/2022 - Present        YAAY (acute kidney injury) ([facility] [address] ICD-10-CM: N17.9  ICD-9-CM: 584.9  8/11/2022 - Present        Epistaxis ICD-10-CM: R04.0  ICD-9-CM: 784.7  4/24/2021 - Present           Greater than 30 minutes were spent with the patient on counseling and coordination of care    Signed:   Sussy Neff MD  8/16/2022  1:10 PM

## 2022-08-16 NOTE — PROGRESS NOTES
Physician Progress Note      PATIENT:               Evelin Segal  CSN #:                  [id number]  :                       1930  ADMIT DATE:       2022 3:17 PM  [address] [place] DATE:        2022 2:10 PM  RESPONDING  PROVIDER #:        Romayne Schilling MD          QUERY TEXT:    Patient admitted with YAYA. Noted documentation of Dementia. In order to support the diagnosis of Dementia, please include additional clinical indicators in your documentation. Or please document if the diagnosis of Dementia has been ruled out after further study. The medical record reflects the following:  Risk Factors: confusion    Clinical Indicators:  RN   RN: confused and agitated, intermittent confusion @ home    PN   Daughter present at bedside. Significantly more confused from his baseline per her    Treatment:  Neuro assessment  lactated Ringers infusion 1,000 mL    Thank you,  Carlos Alberto Dennison RN [facility]  Clinical Documentation  [phone] or via [address] [place] [address] provided:  -- Dementia present as evidenced by, Please document evidence.   -- Dementia was ruled out  -- Other - I will add my own diagnosis  -- Disagree - Not applicable / Not valid  -- Disagree - Clinically unable to determine / Unknown  -- Refer to Clinical Documentation Reviewer    PROVIDER RESPONSE TEXT:    Dementia is present as evidenced by baseline mild dementia per daughter    Query created by: Pierce Merlin on 2022 5:23 PM      Electronically signed by:  Romayne Schilling MD 2022 5:41 PM

## 2022-08-16 NOTE — PROGRESS NOTES
Physician Progress Note      PATIENT:               Maribel Levi  CSN #:                  [id number]  :                       1930  ADMIT DATE:       2022 3:17 PM  [address] [place] DATE:        2022 2:10 PM  RESPONDING  PROVIDER #:        Ryan Kruse MD          QUERY TEXT:    Pt admitted with Pyelonephritis. Pt noted to have Acute metabolic encephalopathy. If possible, please document in progress notes and discharge summary the relationship, if any, between Pyelonephritis and Acute metabolic encephalopathy . The medical record reflects the following:  Risk Factors: Confusion Pyelonephritis  Clinical Indicators:     RN: confused and agitated, intermittent confusion @ home.  PN: Acute ME     WBC 15.6 - 16.3 - 12.9 - 10.4 - 8.7   BUN 39 - 38 - 42 - 35 - 28   UA: Hazy, 100 protein, 40 ketone, mod. blood, mod. leuks, 0 bacteria  UCX: Pseudomonas Aeruginosa (15K)  AbdCT: 2mm stone R ureterovesicular junction causing mild R-sided hydroureteronephrosis      Treatment:     consult  lactated Ringers infusion 1,000 mL x2  cefTRIAXone (ROCEPHIN) 1 g in 0.9% sodium chloride 10 mL IV syringe  cefepime (MAXIPIME) 2 g in 0.9% sodium chloride (MBP/ADV) 100 mL MBP  0.9% sodium chloride infusion    Thank you,  Teresa Gee RN [facility]  Clinical Documentation  [phone] or via [employer]  Options provided:  -- Acute metabolic encephalopathy  due to Pyelonephritis  -- Acute metabolic encephalopathy  unrelated to Pyelonephritis  -- Other - I will add my own diagnosis  -- Disagree - Not applicable / Not valid  -- Disagree - Clinically unable to determine / Unknown  -- Refer to Clinical Documentation Reviewer    PROVIDER RESPONSE TEXT:    This patient has Acute metabolic encephalopathy due to Pyelonephritis.     Query created by: Janelle Lovelace on 2022 5:42 PM      Electronically signed by:  Ryan Kruse MD 2022 5:46 PM

## 2022-08-16 NOTE — PROGRESS NOTES
Problem: Self Care Deficits Care Plan (Adult)  Goal: *Acute Goals and Plan of Care (Insert Text)  Description: FUNCTIONAL STATUS PRIOR TO ADMISSION: Patient was active without use of DME, supervised by daughter with self-care tasks d/t to memory loss    HOME SUPPORT: The patient lived with daughter who works from home. Occupational Therapy Goals  Initiated 8/13/2022  1. Patient will perform grooming stadnding at sink with modified independence within 7 day(s). 2.  Patient will perform bathing with supervision/set-up within 7 day(s). 3.  Patient will perform lower body dressing with modified independence within 7 day(s). 4.  Patient will perform toilet transfers with supervision/set-up within 7 day(s). 5.  Patient will perform all aspects of toileting with supervision/set-up within 7 day(s). 6.  Patient will participate in upper extremity therapeutic exercise/activities with supervision/set-up for 10 minutes within 7 day(s). 7.  Patient will utilize energy conservation techniques during functional activities with verbal cues within 7 day(s). Outcome: Progressing Towards Goal   OCCUPATIONAL THERAPY TREATMENT  Patient: Mari Yeung (80 y.o. male)  Date: 8/16/2022  Diagnosis: YAYA (acute kidney injury) ([facility] [address] [N17.9]  Nephrolithiasis [N20.0] <principal problem not specified>  Procedure(s) (LRB):  CYSTOSCOPY, RIGHT RETROGRADE PYELOGRAM, RIGHT URETERAL STENT PLACEMENT (Right) 4 Days Post-Op  Precautions:    Chart, occupational therapy assessment, plan of care, and goals were reviewed. ASSESSMENT  Patient continues with skilled OT services and is progressing towards goals. Patient continues to be impacted by general weakness, impaired standing tolerance, impaired standing balance during ADL tasks and related mobility. Some lack of insight noted due to cognitive impairment.  Recommend increased assist/supervision upon return home with family, [facility]    Current Level of Function Impacting Discharge (ADLs): min assist to supervision    Other factors to consider for discharge:          PLAN :  Patient continues to benefit from skilled intervention to address the above impairments. Continue treatment per established plan of care to address goals. Recommend with staff: in chair for all meals, ambulate to bathroom     Recommend next OT session: progress POC - HEP  (see handout)    Recommendation for discharge: (in order for the patient to meet his/her long term goals)  Occupational therapy at least 2 days/week in the home     This discharge recommendation:  Has been made in collaboration with the attending provider and/or case management    IF patient discharges home will need the following DME: TDB       SUBJECTIVE:   Patient pleasant and cooperative    OBJECTIVE DATA SUMMARY:   Cognitive/Behavioral Status:  Neurologic State: Alert  Orientation Level: Oriented to person;Oriented to place           Safety/Judgement: Lack of insight into deficits    Functional Mobility and Transfers for ADLs:  Bed Mobility:       Transfers:  Sit to Stand: Supervision     Bed to Chair: Contact guard assistance    Balance:  Sitting: Intact; Without support  Standing: Impaired; Without support  Standing - Static: Fair    ADL Intervention:            Cognitive Retraining  Safety/Judgement: Lack of insight into deficits    Therapeutic Exercises:   Patient provided with and instructed in seated UE/LE AROM exercises. Recommend review and continued training in HEP     Pain:  None indicated     Activity Tolerance:   Fair    After treatment patient left in no apparent distress:   Sitting in chair, Call bell within reach, and Bed / chair alarm activated    COMMUNICATION/COLLABORATION:   The patients plan of care was discussed with: Registered nurse.      Geoff Chapman OT  Time Calculation: 13 mins

## 2022-08-17 LAB
BACTERIA SPEC CULT: NORMAL
SERVICE CMNT-IMP: NORMAL

## (undated) DEVICE — CYSTO/BLADDER IRRIGATION SET, REGULATING CLAMP

## (undated) DEVICE — SOLUTION IRRIGATION H2O 0797305] ICU MEDICAL INC]

## (undated) DEVICE — OPEN-END URETERAL CATHETER: Brand: COOK

## (undated) DEVICE — TUBING, SUCTION, 1/4" X 12', STRAIGHT: Brand: MEDLINE

## (undated) DEVICE — MARKER SKIN GENTIAN VLT FN TIP W/ 6IN RUL L RESVR MED GRD

## (undated) DEVICE — GUIDEWIRE ENDOSCP L150CM DIA0.035IN TIP L15CM BENT PTFE

## (undated) DEVICE — CYSTO-SMH: Brand: MEDLINE INDUSTRIES, INC.

## (undated) DEVICE — SYR 10ML LUER LOK 1/5ML GRAD --

## (undated) DEVICE — GLOVE ORANGE PI 7 1/2   MSG9075

## (undated) DEVICE — STNT URET PFLX BSC -C